# Patient Record
Sex: FEMALE | URBAN - METROPOLITAN AREA
[De-identification: names, ages, dates, MRNs, and addresses within clinical notes are randomized per-mention and may not be internally consistent; named-entity substitution may affect disease eponyms.]

---

## 2017-01-03 ENCOUNTER — IMPORTED ENCOUNTER (OUTPATIENT)
Dept: URBAN - METROPOLITAN AREA CLINIC 1 | Facility: CLINIC | Age: 62
End: 2017-01-03

## 2017-01-03 PROBLEM — H20.012: Noted: 2017-01-03

## 2017-01-03 PROBLEM — H10.402: Noted: 2017-01-03

## 2017-01-03 PROBLEM — H04.322: Noted: 2017-01-03

## 2017-01-03 PROCEDURE — 92012 INTRM OPH EXAM EST PATIENT: CPT

## 2017-01-03 NOTE — PATIENT DISCUSSION
1.  Unspecified Chronic Conjunctivitis OS:Tender medial canthus. No tearing2. Iritis: Primary episode OS: Pred forte QID to affected eye. Return immediately if symptoms worsen. 3. Return for an appointment in 1 week for 10 with Dr. Lilyan Gaucher.

## 2017-01-23 ENCOUNTER — IMPORTED ENCOUNTER (OUTPATIENT)
Dept: URBAN - METROPOLITAN AREA CLINIC 1 | Facility: CLINIC | Age: 62
End: 2017-01-23

## 2017-01-23 PROBLEM — H10.45: Noted: 2017-01-23

## 2017-01-23 PROBLEM — H25.813: Noted: 2017-01-23

## 2017-01-23 PROBLEM — H04.322: Noted: 2017-01-23

## 2017-01-23 PROCEDURE — 92012 INTRM OPH EXAM EST PATIENT: CPT

## 2017-01-23 NOTE — PATIENT DISCUSSION
1.  Acute Dacryocystitis OS - appears resolved on Tobradex. Use Tobradex BID OS x2 weeks then d/c. 2.  Cataract OU: Observe for now without intervention. The patient was advised to contact us if any change or worsening of vision3. Allergic Conjunctivitis OU improving -Cont Zaditor BID OUReturn for an appointment in November 30 with Dr. Janet Kong.

## 2017-02-02 ENCOUNTER — IMPORTED ENCOUNTER (OUTPATIENT)
Dept: URBAN - METROPOLITAN AREA CLINIC 1 | Facility: CLINIC | Age: 62
End: 2017-02-02

## 2017-02-02 PROBLEM — H04.322: Noted: 2017-02-02

## 2017-02-02 PROBLEM — H10.45: Noted: 2017-02-02

## 2017-02-02 PROBLEM — H25.813: Noted: 2017-02-02

## 2017-02-02 PROCEDURE — 92012 INTRM OPH EXAM EST PATIENT: CPT

## 2017-02-02 NOTE — PATIENT DISCUSSION
1.  Episcleritis OS- Start Durezol OS TID (samples given.) F/u in 1 week and consider rheumatological w/u. PCP Luis Alfredo Kinsey. D/C [place]. 2.  Cataract OU: Observe for now without intervention. The patient was advised to contact us if any change or worsening of vision3. Allergic Conjunctivitis OU- Stable OD. Progressive OS. Cont Zaditor BID OU4. Return for an appointment for a 10/check symtoms in 1 week with Dr. Melody Lim.

## 2017-02-08 ENCOUNTER — IMPORTED ENCOUNTER (OUTPATIENT)
Dept: URBAN - METROPOLITAN AREA CLINIC 1 | Facility: CLINIC | Age: 62
End: 2017-02-08

## 2017-02-08 PROBLEM — H25.813: Noted: 2017-02-08

## 2017-02-08 PROBLEM — H10.45: Noted: 2017-02-08

## 2017-02-08 PROBLEM — H04.322: Noted: 2017-02-08

## 2017-02-08 PROCEDURE — 92012 INTRM OPH EXAM EST PATIENT: CPT

## 2017-02-08 NOTE — PATIENT DISCUSSION
1.  Episcleritis OS- Much improved. Continue Durezol OS TID and then decrease to BID OS on Sunday for 1 week then decrease to QD OS until follow up. 2.  Cataract OU: Observe for now without intervention. The patient was advised to contact us if any change or worsening of vision. 3. Allergic Conjunctivitis OU - Stable OD. Improving OS. Cont Zaditor BID OU. Return for an appointment in 5-6 weeks for 10 with Dr. Santo Bond.

## 2017-08-15 ENCOUNTER — OFFICE VISIT (OUTPATIENT)
Dept: ORTHOPEDIC SURGERY | Facility: CLINIC | Age: 62
End: 2017-08-15

## 2017-08-15 VITALS
BODY MASS INDEX: 34.73 KG/M2 | OXYGEN SATURATION: 98 % | SYSTOLIC BLOOD PRESSURE: 133 MMHG | DIASTOLIC BLOOD PRESSURE: 78 MMHG | HEART RATE: 64 BPM | TEMPERATURE: 97.6 F | HEIGHT: 63 IN | WEIGHT: 196 LBS

## 2017-08-15 DIAGNOSIS — M79.89 RIGHT LEG SWELLING: ICD-10-CM

## 2017-08-15 DIAGNOSIS — M25.571 ACUTE RIGHT ANKLE PAIN: ICD-10-CM

## 2017-08-15 DIAGNOSIS — M17.11 PRIMARY OSTEOARTHRITIS OF RIGHT KNEE: Primary | ICD-10-CM

## 2017-08-15 RX ORDER — HYDROCHLOROTHIAZIDE 25 MG/1
TABLET ORAL
COMMUNITY
Start: 2017-07-10 | End: 2019-12-21

## 2017-08-15 RX ORDER — DUREZOL 0.5 MG/ML
EMULSION OPHTHALMIC
Refills: 3 | COMMUNITY
Start: 2017-08-06

## 2017-08-15 RX ORDER — NAPROXEN 375 MG/1
TABLET ORAL
Refills: 3 | COMMUNITY
Start: 2017-08-06

## 2017-08-15 RX ORDER — OMEPRAZOLE 40 MG/1
CAPSULE, DELAYED RELEASE ORAL
Refills: 3 | COMMUNITY
Start: 2017-08-05

## 2017-08-15 RX ORDER — BETAMETHASONE SODIUM PHOSPHATE AND BETAMETHASONE ACETATE 3; 3 MG/ML; MG/ML
6 INJECTION, SUSPENSION INTRA-ARTICULAR; INTRALESIONAL; INTRAMUSCULAR; SOFT TISSUE ONCE
Qty: 1 ML | Refills: 0
Start: 2017-08-15 | End: 2017-08-15

## 2017-08-15 RX ORDER — DILTIAZEM HYDROCHLORIDE 360 MG/1
CAPSULE, EXTENDED RELEASE ORAL
COMMUNITY
Start: 2017-07-10

## 2017-08-15 NOTE — PROGRESS NOTES
Patient: Krishna Sommers                MRN: [id number]       SSN: [id number]  YOB: 1955        AGE: 58 y.o. SEX: female  Body mass index is 34.72 kg/(m^2). PCP: Van Ruelas DO  08/15/17    HISTORY: Nathanael Oneil is here today mainly for right knee pain. She has noticed some swelling in her right ankle as well. It is not too sore, mainly just swollen, but she is having a lot of right knee pain. It has been ongoing, really, since the spring. She has had no particular trauma. She has had broken bones in the left foot and rheumatoid arthritis does run in the family on the mothers side. She has problems with stairs, kneeling, getting up and down from a chair, and some discomfort in the cast as well. PHYSICAL EXAMINATION:  On examination today, she does have just a slight pitting edema involving the right lower extremity. The calf is mildly tender on the right side but not swollen. Jeanette's sign is negative. The knee itself has good motion and a slight effusion. There is distinct medial joint line tenderness. She has a painful but nonclicking Judy's test.  The quadriceps are intact. The hips rotate nicely. The left lower extremity is noncontributory. She is alert and oriented. Affect is normal.  Sensation is normal.  She appears younger than her stated age of 46. RADIOGRAPHS:  Review of her x-rays, including three views of the ankle, are negative for fracture. She does have some arthritis in the hindfoot and the subtalar region, also a little bit tibiotalar, but not severely so. AP, tunnel, lateral, and skyline of the knees reveal moderate arthritis of the knees. IMPRESSION:  My overall impression is moderate arthritis, especially right knee, possibility of meniscal tear.      PROCEDURE:  Under aseptic conditions and after informed, written consent with a time out, the right knee was injected with 1 cc of the Celestone preparation, i.e. 6 mg, which was well tolerated. PLAN:  Treatment options were given, and I would recommend a cortisone injection. She is going to return to see us in a few weeks time to see how the injection is working, and tomorrow, we will get a Doppler ultrasound to rule out DVT for the right calf as well. It has been an absolute pleasure to share in her care. Certainly, if she is not pleased with the results of the right knee injection, we will be proceeding to MRI to look for meniscal tear. REVIEW OF SYSTEMS:      CON: negative for weight loss, fever  EYE: negative for double vision  ENT: negative for hoarseness  RS:   negative for Tb  GI:    negative for blood in stool  :  negative for blood in urine  Other systems reviewed and noted below. Past Medical History:   Diagnosis Date    [facility] obstructive pulmonary disease ([facility] [address]     Colon polyps     Hypertension     Left adrenal mass ([facility] [address]        History reviewed. No pertinent family history. Current Outpatient Prescriptions   Medication Sig Dispense Refill    naproxen (NAPROSYN) 375 mg tablet TAKE 1 TABLET BY MOUTH TWICE A DAY WITH FOOD  3    dilTIAZem (TIAZAC) 360 mg ER capsule       hydroCHLOROthiazide (HYDRODIURIL) 25 mg tablet       omeprazole (PRILOSEC) 40 mg capsule TAKE ONE CAPSULE BY MOUTH EVERY MORNING  3    DUREZOL 0.05 % ophthalmic emulsion INSTILL 1 DROP IN LEFT EYE ONCE DAILY  3    olmesartan-hydrochlorothiazide (BENICAR HCT) 40-25 mg per tablet Take 1 Tab by mouth daily.  multivitamins chew Take  by mouth. No Known Allergies    Past Surgical History:   Procedure Laterality Date    HX HYSTERECTOMY  2004    HX PARTIAL HYSTERECTOMY  1990s       Social History     Social History    Marital status: UNKNOWN     Spouse name: N/A    Number of children: N/A    Years of education: N/A     Occupational History    Not on file.      Social History Main Topics    Smoking status: Former Smoker    Smokeless tobacco: Never Used    Alcohol use 1.0 oz/week     2 Cans of beer per week    Drug use: Not on file    Sexual activity: Yes     Other Topics Concern    Not on file     Social History Narrative       Visit Vitals    /78    Pulse 64    Temp 97.6 °F (36.4 °C) (Oral)    Ht 5' 3\" (1.6 m)    Wt 196 lb (88.9 kg)    SpO2 98%    BMI 34.72 kg/m2         PHYSICAL EXAMINATION:  GENERAL: Alert and oriented x3, in no acute distress, well-developed, well-nourished, afebrile. HEART: No JVD. EYES: No scleral icterus   NECK: No significant lymphadenopathy   LUNGS: No respiratory compromise or indrawing  ABDOMEN: Soft, non-tender, non-distended. Electronically signed by:  Juan Godoy MD

## 2017-09-13 ENCOUNTER — TELEPHONE (OUTPATIENT)
Dept: ORTHOPEDIC SURGERY | Age: 62
End: 2017-09-13

## 2017-09-14 NOTE — TELEPHONE ENCOUNTER
Patient made aware of below info. She states that she will have to call back later and make an appointment with Dr. Harrison Munguia.

## 2018-03-21 ENCOUNTER — HOSPITAL ENCOUNTER (OUTPATIENT)
Dept: PHYSICAL THERAPY | Age: 63
Discharge: HOME | End: 2018-03-21
Payer: COMMERCIAL

## 2018-03-21 PROCEDURE — 97110 THERAPEUTIC EXERCISES: CPT

## 2018-03-21 PROCEDURE — 97162 PT EVAL MOD COMPLEX 30 MIN: CPT

## 2018-03-21 NOTE — PROGRESS NOTES
PT DAILY TREATMENT NOTE     Patient Name: Claudia Marie  Date:3/21/2018  : 1955  [x]  Patient  Verified  Payor: BLUE CROSS / Plan: Diana Mcke[employer] [id number] PPO / Product Type: PPO /    In time:508  Out time:543  Total Treatment Time (min): 35  Visit #: 1 of 8    Treatment Area: Pain in left knee [M25.562]    SUBJECTIVE  Pain Level (0-10 scale): 9  Any medication changes, allergies to medications, adverse drug reactions, diagnosis change, or new procedure performed?: [x] No    [] Yes (see summary sheet for update)  Subjective functional status/changes:   [] No changes reported  See eval    OBJECTIVE    10 min [x]Eval                  []Re-Eval       25 min Therapeutic Exercise:  [] See flow sheet :   Rationale: increase ROM and increase strength to improve the patients ability to perform daily activites       With   [] TE   [] TA   [] neuro   [] other: Patient Education: [x] Review HEP    [] Progressed/Changed HEP based on:   [] positioning   [] body mechanics   [] transfers   [] heat/ice application    [] other:      Other Objective/Functional Measures:      Pain Level (0-10 scale) post treatment: 8    ASSESSMENT/Changes in Function: see POC    Patient will continue to benefit from skilled PT services to modify and progress therapeutic interventions, address functional mobility deficits, address ROM deficits, address strength deficits, analyze and address soft tissue restrictions, analyze and cue movement patterns and address imbalance/dizziness to attain remaining goals. [x]  See Plan of Care  []  See progress note/recertification  []  See Discharge Summary         Progress towards goals / Updated goals:  Short Term Goals: To be accomplished in 2 weeks:                         1. I and compliant with HEP for self management of symptoms. 2. Increase L knee PROM to 0-120 to increase ease with sit<>stand. Long Term Goals:  To be accomplished in 4 weeks:                         1. Improve FOTO to 57 to indicate improved function with daily activities. 2. Increase L knee AROM to 0-130 to increase ease with reciprocal stair stepping.   3. Increase L LE strength to grossly 4/5 to enable patient to ambulate community distances without SPC.       PLAN  []  Upgrade activities as tolerated     [x]  Continue plan of care  []  Update interventions per flow sheet       []  Discharge due to:_  []  Other:_      Karol Dopp, PT 3/21/2018  6:33 PM    Future Appointments  Date Time Provider Corina Hall   3/23/2018 4:30 PM Sandra Giovany, PTA [facility]   3/28/2018 4:30 PM Sandra Giovany, PTA [facility]   3/30/2018 4:30 PM Sandra Giovany, PTA [facility]   4/2/2018 10:30 AM Sandra Giovany, PTA [facility]   4/9/2018 5:00 PM Sandra Giovany, PTA [facility]   4/11/2018 4:30 PM Karol Dopp, PT [facility]   4/18/2018 4:30 PM Karol Dopp, PT [facility]

## 2018-03-21 NOTE — PROGRESS NOTES
.[facility]  [address] [place] [address], [facility] [address]  [phone] fax    Plan of Care/ Statement of Necessity for Physical Therapy Services    Patient name: Miguel Patton Start of Care: 3/21/2018   Referral source: Lexus Murray MD : 1955    Medical Diagnosis: Pain in left knee [M25.562]   Onset Date:3/1/18    Treatment Diagnosis: L knee pain s/p meniscal debridement    Prior Hospitalization: see medical history Provider#: [id number]   Medications: Verified on Patient summary List    Comorbidities: HTN   Prior Level of Function: Able to ambulate without SPC; works in medical records (sits at desk); able to ascend/descend stairs     The Plan of Care and following information is based on the information from the initial evaluation. Assessment/ key information: 58y.o. year old female presents with CC of L knee pain and swelling. Deficits include: increased edema noted at L knee joint line and ankle; decreased L knee AROM, strength and stability. Patient will benefit from physical therapy to address deficits, and ultimately to return patient to prior level of function.     Evaluation Complexity History LOW Complexity : Zero comorbidities / personal factors that will impact the outcome / POC; Examination MEDIUM Complexity : 3 Standardized tests and measures addressing body structure, function, activity limitation and / or participation in recreation  ;Presentation LOW Complexity : Stable, uncomplicated  ;Clinical Decision Making MEDIUM Complexity : FOTO score of 26-74  Overall Complexity Rating: MEDIUM  Problem List: pain affecting function, decrease ROM, decrease strength, edema affecting function, impaired gait/ balance, decrease ADL/ functional abilitiies, decrease activity tolerance and decrease flexibility/ joint mobility   Treatment Plan may include any combination of the following: Therapeutic exercise, Neuromuscular re-education, Physical agent/modality, Gait/balance training, Manual therapy and Patient education  Patient / Family readiness to learn indicated by: asking questions, trying to perform skills and interest  Persons(s) to be included in education: patient (P)  Barriers to Learning/Limitations: None  Patient Goal (s): be able to do activities without pain  Patient Self Reported Health Status: good  Rehabilitation Potential: good    Short Term Goals: To be accomplished in 2 weeks:   1. I and compliant with HEP for self management of symptoms. 2. Increase L knee PROM to 0-120 to increase ease with sit<>stand. Long Term Goals: To be accomplished in 4 weeks:   1. Improve FOTO to 57 to indicate improved function with daily activities. 2. Increase L knee AROM to 0-130 to increase ease with reciprocal stair stepping. 3. Increase L LE strength to grossly 4/5 to enable patient to ambulate community distances without SPC. Frequency / Duration: Patient to be seen 2 times per week for 4 weeks. Patient/ Caregiver education and instruction: Diagnosis, prognosis, exercises   [x]  Plan of care has been reviewed with ALYSIA Hogue, PT 3/21/2018 6:28 PM    ________________________________________________________________________    I certify that the above Therapy Services are being furnished while the patient is under my care. I agree with the treatment plan and certify that this therapy is necessary.     [de-identified] Signature:____________________  Date:____________Time: _________    Please sign and return to [facility]  [address]  [place], [address]  [phone] fax

## 2018-03-23 ENCOUNTER — HOSPITAL ENCOUNTER (OUTPATIENT)
Dept: PHYSICAL THERAPY | Age: 63
Discharge: HOME | End: 2018-03-23
Payer: COMMERCIAL

## 2018-03-23 PROCEDURE — 97110 THERAPEUTIC EXERCISES: CPT

## 2018-03-23 PROCEDURE — 97140 MANUAL THERAPY 1/> REGIONS: CPT

## 2018-03-23 PROCEDURE — 97016 VASOPNEUMATIC DEVICE THERAPY: CPT

## 2018-03-23 PROCEDURE — 97112 NEUROMUSCULAR REEDUCATION: CPT

## 2018-03-23 NOTE — PROGRESS NOTES
PT DAILY TREATMENT NOTE     Patient Name: Alondra Hutton  Date:3/23/2018  : 1955  [x]  Patient  Verified  Payor: BLUE CROSS / Plan: [place] BLUE [facility] PPO / Product Type: PPO /    In time:4:20  Out time:5:05  Total Treatment Time (min): 45  Visit #: 2 of 8    Treatment Area: Pain in left knee [M25.562]    SUBJECTIVE  Pain Level (0-10 scale): 9/10  Any medication changes, allergies to medications, adverse drug reactions, diagnosis change, or new procedure performed?: [x] No    [] Yes (see summary sheet for update)  Subjective functional status/changes:   [] No changes reported  Pt reports no change in symptoms. Pt reports performing HEP without difficulty    OBJECTIVE    Modality rationale: decrease inflammation and decrease pain to improve the patients ability to tolerate ADLs.     Min Type Additional Details    [] Estim:  []Unatt       []IFC  []Premod                        []Other:  []w/ice   []w/heat  Position:  Location:    [] Estim: []Att    []TENS instruct  []NMES                    []Other:  []w/US   []w/ice   []w/heat  Position:  Location:    []  Traction: [] Cervical       []Lumbar                       [] Prone          []Supine                       []Intermittent   []Continuous Lbs:  [] before manual  [] after manual    []  Ultrasound: []Continuous   [] Pulsed                           []1MHz   []3MHz W/cm2:  Location:    []  Iontophoresis with dexamethasone         Location: [] Take home patch   [] In clinic    []  Ice     []  heat  []  Ice massage  []  Laser   []  Anodyne Position:  Location:    []  Laser with stim  []  Other:  Position:  Location:   10 [x]  Vasopneumatic Device Pressure:       [x] lo [] med [] hi   Temperature: [x] lo [] med [] hi   [] Skin assessment post-treatment:  []intact []redness- no adverse reaction    []redness - adverse reaction:     17 min Therapeutic Exercise:  [x] See flow sheet :   Rationale: increase ROM and increase strength to improve the patients ability to tolerate ADLs. 10 min Neuromuscular Re-education:  [x]  See flow sheet :   Rationale: increase strength, improve coordination and increase proprioception  to improve the patients ability to perform functional activities. 8 min Manual Therapy:  DTM to popliteus   Rationale: decrease pain, increase ROM and increase tissue extensibility to improve tolerance to functional activities. With   [] TE   [] TA   [] neuro   [] other: Patient Education: [x] Review HEP    [] Progressed/Changed HEP based on:   [] positioning   [] body mechanics   [] transfers   [] heat/ice application    [] other:      Other Objective/Functional Measures: initiated exercises as per flow sheet. Pain Level (0-10 scale) post treatment: 5/10    ASSESSMENT/Changes in Function: Pt demonstrates good proprioception with toe taps with no LOB. Patient will continue to benefit from skilled PT services to modify and progress therapeutic interventions, address functional mobility deficits, address ROM deficits, address strength deficits, analyze and address soft tissue restrictions, analyze and cue movement patterns and analyze and modify body mechanics/ergonomics to attain remaining goals. []  See Plan of Care  []  See progress note/recertification  []  See Discharge Summary         Progress towards goals / Updated goals:  Short Term Goals: To be accomplished in 2 weeks:                         2. I and compliant with HEP for self management of symptoms. - met per patient report. 3/23/18  2. Increase L knee PROM to 0-120 to increase ease with sit<>stand. Long Term Goals: To be accomplished in 4 weeks:                         1. Improve FOTO to 57 to indicate improved function with daily activities. 2. Increase L knee AROM to 0-130 to increase ease with reciprocal stair stepping. 3. Increase L LE strength to grossly 4/5 to enable patient to ambulate community distances without SPC.     PLAN  [] Upgrade activities as tolerated     []  Continue plan of care  []  Update interventions per flow sheet       []  Discharge due to:_  []  Other:_      Phong Glez, PTA 3/23/2018  4:24 PM    Future Appointments  Date Time Provider Corina Hall   3/23/2018 4:30 PM Phong Glez, PTA [facility]   3/28/2018 4:30 PM Phong Glez, PTA [facility]   3/30/2018 4:30 PM Phong Glez, PTA [facility]   4/2/2018 10:30 AM Phong Glez, PTA [facility]   4/9/2018 5:00 PM Verjordan lGez, PTA [facility]   4/11/2018 4:30 PM Melchor Johnson, PT [facility]   4/18/2018 4:30 PM Melchor Johnson, PT [facility]

## 2018-03-28 ENCOUNTER — HOSPITAL ENCOUNTER (OUTPATIENT)
Dept: PHYSICAL THERAPY | Age: 63
Discharge: HOME | End: 2018-03-28
Payer: COMMERCIAL

## 2018-03-28 PROCEDURE — 97116 GAIT TRAINING THERAPY: CPT

## 2018-03-28 PROCEDURE — 97110 THERAPEUTIC EXERCISES: CPT

## 2018-03-28 NOTE — PROGRESS NOTES
PT DAILY TREATMENT NOTE     Patient Name: Jyoti Knutson  Date:3/28/2018  : 1955  [x]  Patient  Verified  Payor: BLUE CROSS / Plan: Diana Mckeon [id number] PPO / Product Type: PPO /    In time:4:30  Out time:5:13  Total Treatment Time (min): 43  Visit #: 3 of 8    Treatment Area: Pain in left knee [M25.562]    SUBJECTIVE  Pain Level (0-10 scale): 7/10  Any medication changes, allergies to medications, adverse drug reactions, diagnosis change, or new procedure performed?: [x] No    [] Yes (see summary sheet for update)  Subjective functional status/changes:   [] No changes reported  Pt reports no new complaints of pain. Pt reports compliance with HEP. OBJECTIVE    Modality rationale: decrease inflammation and decrease pain to improve the patients ability to tolerate ADLs.     Min Type Additional Details    [] Estim:  []Unatt       []IFC  []Premod                        []Other:  []w/ice   []w/heat  Position:  Location:    [] Estim: []Att    []TENS instruct  []NMES                    []Other:  []w/US   []w/ice   []w/heat  Position:  Location:    []  Traction: [] Cervical       []Lumbar                       [] Prone          []Supine                       []Intermittent   []Continuous Lbs:  [] before manual  [] after manual    []  Ultrasound: []Continuous   [] Pulsed                           []1MHz   []3MHz W/cm2:  Location:    []  Iontophoresis with dexamethasone         Location: [] Take home patch   [] In clinic    []  Ice     []  heat  []  Ice massage  []  Laser   []  Anodyne Position:  Location:    []  Laser with stim  []  Other:  Position:  Location:   10 [x]  Vasopneumatic Device Pressure:       [x] lo [] med [] hi   Temperature: [x] lo [] med [] hi   [x] Skin assessment post-treatment:  [x]intact []redness- no adverse reaction    []redness - adverse reaction:     25 min Therapeutic Exercise:  [x] See flow sheet :   Rationale: increase ROM and increase strength to improve the patients ability to tolerate ADLs. 8 min Gait Trainin feet with no device on level surfaces with SBA level of assist   Rationale: With   [] TE   [] TA   [] neuro   [] other: Patient Education: [x] Review HEP    [] Progressed/Changed HEP based on:   [] positioning   [] body mechanics   [] transfers   [] heat/ice application    [] other:      Other Objective/Functional Measures: added gait training without AD. Pain Level (0-10 scale) post treatment: 6/10    ASSESSMENT/Changes in Function: Pt demonstrates good proprioception and control when ambulating without AD. Patient will continue to benefit from skilled PT services to modify and progress therapeutic interventions, address functional mobility deficits, address ROM deficits, address strength deficits, analyze and address soft tissue restrictions, analyze and cue movement patterns, analyze and modify body mechanics/ergonomics and assess and modify postural abnormalities to attain remaining goals. []  See Plan of Care  []  See progress note/recertification  []  See Discharge Summary         Progress towards goals / Updated goals:  Short Term Goals: To be accomplished in 2 weeks:                         3. I and compliant with HEP for self management of symptoms. - met per patient report. 3/23/18  2. Increase L knee PROM to 0-120 to increase ease with sit<>stand. Long Term Goals: To be accomplished in 4 weeks:                         1. Improve FOTO to 57 to indicate improved function with daily activities. 2. Increase L knee AROM to 0-130 to increase ease with reciprocal stair stepping. 3. Increase L LE strength to grossly 4/5 to enable patient to ambulate community distances without SPC.     PLAN  []  Upgrade activities as tolerated     [x]  Continue plan of care  []  Update interventions per flow sheet       []  Discharge due to:_  []  Other:_      Carl Jones PTA 3/28/2018  4:59 PM    Future Appointments  Date Time Provider Corina Isabel   3/30/2018 4:30 PM Robson Canner, PTA [facility]   4/2/2018 10:30 AM Robson Canner, PTA [facility]   4/9/2018 5:00 PM Robson Canner, PTA [facility]   4/11/2018 4:30 PM Cinda Axe, PT [facility]   4/18/2018 4:30 PM Cinda Axe, PT [facility]

## 2018-03-30 ENCOUNTER — HOSPITAL ENCOUNTER (OUTPATIENT)
Dept: PHYSICAL THERAPY | Age: 63
Discharge: HOME | End: 2018-03-30
Payer: COMMERCIAL

## 2018-03-30 PROCEDURE — 97112 NEUROMUSCULAR REEDUCATION: CPT

## 2018-03-30 PROCEDURE — 97016 VASOPNEUMATIC DEVICE THERAPY: CPT

## 2018-03-30 PROCEDURE — 97110 THERAPEUTIC EXERCISES: CPT

## 2018-03-30 NOTE — PROGRESS NOTES
PT DAILY TREATMENT NOTE - [facility]     Patient Name: Ana Reed  Date:3/30/2018  : 1955  [x]  Patient  Verified  Payor: BLUE CROSS / Plan: Diana Mckeon [id number] PPO / Product Type: PPO /    In time:4:30  Out time:5:11  Total Treatment Time (min): 41  Visit #: 4 of 8    Treatment Area: Pain in left knee [M25.562]    SUBJECTIVE  Pain Level (0-10 scale): 6/10  Any medication changes, allergies to medications, adverse drug reactions, diagnosis change, or new procedure performed?: [x] No    [] Yes (see summary sheet for update)  Subjective functional status/changes:   [] No changes reported  Pt reports no new complaints of pain. Pt reports compliance with HEP and decreased pain. OBJECTIVE    Modality rationale: decrease inflammation and decrease pain to improve the patients ability to tolerate ADLs.     Min Type Additional Details    [] Estim:  []Unatt       []IFC  []Premod                        []Other:  []w/ice   []w/heat  Position:  Location:    [] Estim: []Att    []TENS instruct  []NMES                    []Other:  []w/US   []w/ice   []w/heat  Position:  Location:    []  Traction: [] Cervical       []Lumbar                       [] Prone          []Supine                       []Intermittent   []Continuous Lbs:  [] before manual  [] after manual    []  Ultrasound: []Continuous   [] Pulsed                           []1MHz   []3MHz W/cm2:  Location:    []  Iontophoresis with dexamethasone         Location: [] Take home patch   [] In clinic    []  Ice     []  heat  []  Ice massage  []  Laser   []  Anodyne Position:  Location:    []  Laser with stim  []  Other:  Position:  Location:   10 [x]  Vasopneumatic Device Pressure:       [x] lo [] med [] hi   Temperature: [x] lo [] med [] hi   [] Skin assessment post-treatment:  []intact []redness- no adverse reaction    []redness - adverse reaction:     21 min Therapeutic Exercise:  [x] See flow sheet :   Rationale: increase ROM and increase strength to improve the patients ability to tolerate ADLs. 10 min Neuromuscular Re-education:  [x]  See flow sheet :   Rationale: increase strength, improve coordination and increase proprioception  to improve the patients ability to perform functional activities. With   [] TE   [] TA   [] neuro   [] other: Patient Education: [x] Review HEP    [] Progressed/Changed HEP based on:   [] positioning   [] body mechanics   [] transfers   [] heat/ice application    [] other:      Other Objective/Functional Measures: Pt ambulates without AD with no antalgic gait pattern. Pain Level (0-10 scale) post treatment: 7/10    ASSESSMENT/Changes in Function: Pt demonstrates improved functional mobility and strength with ability to ambulate without AD. Patient will continue to benefit from skilled PT services to modify and progress therapeutic interventions, address functional mobility deficits, address ROM deficits, address strength deficits, analyze and address soft tissue restrictions, analyze and cue movement patterns and analyze and modify body mechanics/ergonomics to attain remaining goals. []  See Plan of Care  []  See progress note/recertification  []  See Discharge Summary         Progress towards goals / Updated goals:  Short Term Goals: To be accomplished in 2 weeks:                         8. I and compliant with HEP for self management of symptoms.  - met per patient report. 3/23/18  2. Increase L knee PROM to 0-120 to increase ease with sit<>stand.- Met. 3/30/18  Long Term Goals: To be accomplished in 4 weeks:                         1. Improve FOTO to 57 to indicate improved function with daily activities. 2. Increase L knee AROM to 0-130 to increase ease with reciprocal stair stepping. 3. Increase L LE strength to grossly 4/5 to enable patient to ambulate community distances without SPC.     PLAN  []  Upgrade activities as tolerated     [x]  Continue plan of care  []  Update interventions per flow sheet       []  Discharge due to:_  []  Other:_      Katarina Hussein PTA 3/30/2018  5:11 PM    Future Appointments  Date Time Provider Corina Hall   4/2/2018 10:30 AM Katarina Hussein PTA [facility]   4/9/2018 5:00 PM Katarina Hussein PTA [facility]   4/11/2018 4:30 PM Layla Miguel, PT [facility]   4/18/2018 4:30 PM Layla Miguel, PT [facility]

## 2018-04-02 ENCOUNTER — HOSPITAL ENCOUNTER (OUTPATIENT)
Dept: PHYSICAL THERAPY | Age: 63
Discharge: HOME | End: 2018-04-02
Payer: COMMERCIAL

## 2018-04-02 PROCEDURE — 97016 VASOPNEUMATIC DEVICE THERAPY: CPT

## 2018-04-02 PROCEDURE — 97110 THERAPEUTIC EXERCISES: CPT

## 2018-04-02 PROCEDURE — 97112 NEUROMUSCULAR REEDUCATION: CPT

## 2018-04-02 NOTE — PROGRESS NOTES
PT DAILY TREATMENT NOTE     Patient Name: Jennifer Began  Date:2018  : 1955  [x]  Patient  Verified  Payor: BLUE CROSS / Plan: [facility] PPO / Product Type: PPO /    In time:10:32  Out time:11:13  Total Treatment Time (min): 41  Visit #: 5 of 8    Treatment Area: Pain in left knee [M25.562]    SUBJECTIVE  Pain Level (0-10 scale): 7/10  Any medication changes, allergies to medications, adverse drug reactions, diagnosis change, or new procedure performed?: [x] No    [] Yes (see summary sheet for update)  Subjective functional status/changes:   [] No changes reported  Pt reports no new complaints of pain. Pt reports compliance with HEP. OBJECTIVE    Modality rationale: decrease inflammation and decrease pain to improve the patients ability to tolerate ADLs.     Min Type Additional Details    [] Estim:  []Unatt       []IFC  []Premod                        []Other:  []w/ice   []w/heat  Position:  Location:    [] Estim: []Att    []TENS instruct  []NMES                    []Other:  []w/US   []w/ice   []w/heat  Position:  Location:    []  Traction: [] Cervical       []Lumbar                       [] Prone          []Supine                       []Intermittent   []Continuous Lbs:  [] before manual  [] after manual    []  Ultrasound: []Continuous   [] Pulsed                           []1MHz   []3MHz W/cm2:  Location:    []  Iontophoresis with dexamethasone         Location: [] Take home patch   [] In clinic    []  Ice     []  heat  []  Ice massage  []  Laser   []  Anodyne Position:  Location:    []  Laser with stim  []  Other:  Position:  Location:   10 [x]  Vasopneumatic Device Pressure:       [x] lo [] med [] hi   Temperature: [x] lo [] med [] hi   [] Skin assessment post-treatment:  []intact []redness- no adverse reaction    []redness - adverse reaction:     21 min Therapeutic Exercise:  [x] See flow sheet :   Rationale: increase ROM and increase strength to improve the patients ability to tolerate ADLs. 10 min Neuromuscular Re-education:  [x]  See flow sheet :   Rationale: increase strength, improve coordination and increase proprioception  to improve the patients ability to tolerate ADLs. With   [] TE   [] TA   [] neuro   [] other: Patient Education: [x] Review HEP    [] Progressed/Changed HEP based on:   [] positioning   [] body mechanics   [] transfers   [] heat/ice application    [] other:      Other Objective/Functional Measures: Added shuttle press; mod vc's to correct form with all exercises. FOTO: 30    Pain Level (0-10 scale) post treatment: 7/10    ASSESSMENT/Changes in Function: pt demonstrates good form and LLE control with all therapeutic exercises. Patient will continue to benefit from skilled PT services to modify and progress therapeutic interventions, address functional mobility deficits, address ROM deficits, address strength deficits, analyze and address soft tissue restrictions and analyze and cue movement patterns to attain remaining goals. []  See Plan of Care  []  See progress note/recertification  []  See Discharge Summary         Progress towards goals / Updated goals:  Short Term Goals: To be accomplished in 2 weeks:                         5. I and compliant with HEP for self management of symptoms.  - met per patient report. 3/23/18  2. Increase L knee PROM to 0-120 to increase ease with sit<>stand.- Met. 3/30/18  Long Term Goals: To be accomplished in 4 weeks:                         1. Improve FOTO to 57 to indicate improved function with daily activities. - not met FOTO 60 30%. 4/2/18   2. Increase L knee AROM to 0-130 to increase ease with reciprocal stair stepping. 3. Increase L LE strength to grossly 4/5 to enable patient to ambulate community distances without SPC.     PLAN  []  Upgrade activities as tolerated     [x]  Continue plan of care  []  Update interventions per flow sheet       []  Discharge due to:_  []  Other:_ Pierce Melendez PTA 4/2/2018  10:47 AM    Future Appointments  Date Time Provider Corina Hall   4[facility]9/2018 5:00 PM [facility]   4[facility]11/2018 4:30 [facility]   4/18/2018 4:30 PM [facility]

## 2018-04-09 ENCOUNTER — HOSPITAL ENCOUNTER (OUTPATIENT)
Dept: PHYSICAL THERAPY | Age: 63
Discharge: HOME | End: 2018-04-09
Payer: COMMERCIAL

## 2018-04-09 PROCEDURE — 97112 NEUROMUSCULAR REEDUCATION: CPT

## 2018-04-09 PROCEDURE — 97110 THERAPEUTIC EXERCISES: CPT

## 2018-04-09 PROCEDURE — 97016 VASOPNEUMATIC DEVICE THERAPY: CPT

## 2018-04-09 NOTE — PROGRESS NOTES
PT DAILY TREATMENT NOTE     Patient Name: Griselda Rey  Date:2018  : 1955  [x]  Patient  Verified  Payor: BLUE CROSS / Plan: Diana Mckeon [id number] PPO / Product Type: PPO /    In time:5:00  Out time:5:40  Total Treatment Time (min): 40  Visit #: 6 of 8    Treatment Area: Pain in left knee [M25.562]    SUBJECTIVE  Pain Level (0-10 scale): 510  Any medication changes, allergies to medications, adverse drug reactions, diagnosis change, or new procedure performed?: [x] No    [] Yes (see summary sheet for update)  Subjective functional status/changes:   [] No changes reported  Pt reports no new complaints of pain. Pt reports compliance with HEP. OBJECTIVE    Modality rationale: decrease inflammation and decrease pain to improve the patients ability to tolerate ADLs.     Min Type Additional Details    [] Estim:  []Unatt       []IFC  []Premod                        []Other:  []w/ice   []w/heat  Position:  Location:    [] Estim: []Att    []TENS instruct  []NMES                    []Other:  []w/US   []w/ice   []w/heat  Position:  Location:    []  Traction: [] Cervical       []Lumbar                       [] Prone          []Supine                       []Intermittent   []Continuous Lbs:  [] before manual  [] after manual    []  Ultrasound: []Continuous   [] Pulsed                           []1MHz   []3MHz W/cm2:  Location:    []  Iontophoresis with dexamethasone         Location: [] Take home patch   [] In clinic    []  Ice     []  heat  []  Ice massage  []  Laser   []  Anodyne Position:  Location:    []  Laser with stim  []  Other:  Position:  Location:   10 [x]  Vasopneumatic Device Pressure:       [x] lo [] med [] hi   Temperature: [x] lo [] med [] hi   [] Skin assessment post-treatment:  []intact []redness- no adverse reaction    []redness - adverse reaction:     20 min Therapeutic Exercise:  [x] See flow sheet :   Rationale: increase ROM and increase strength to improve the patients ability to tolerate ADLs. 10 min Neuromuscular Re-education:  []  See flow sheet :   Rationale: increase strength, improve coordination and increase proprioception  to improve the patients ability to perform functional activities. With   [] TE   [] TA   [] neuro   [] other: Patient Education: [x] Review HEP    [] Progressed/Changed HEP based on:   [] positioning   [] body mechanics   [] transfers   [] heat/ice application    [] other:      Other Objective/Functional Measures: AROM Right knee 0-125 degrees. Pain Level (0-10 scale) post treatment: 6/10    ASSESSMENT/Changes in Function: Pt demonstrates continued progress with functional mobility and AROM. Minimal antalgic gait pattern noted when fatigued. Patient will continue to benefit from skilled PT services to modify and progress therapeutic interventions, address functional mobility deficits, address ROM deficits, address strength deficits, analyze and address soft tissue restrictions, analyze and cue movement patterns and analyze and modify body mechanics/ergonomics to attain remaining goals. []  See Plan of Care  []  See progress note/recertification  []  See Discharge Summary         Progress towards goals / Updated goals:  Short Term Goals: To be accomplished in 2 weeks:                         9. I and compliant with HEP for self management of symptoms.  - met per patient report. 3/23/18  2. Increase L knee PROM to 0-120 to increase ease with sit<>stand.- Met. 3/30/18  Long Term Goals: To be accomplished in 4 weeks:                         1. Improve FOTO to 57 to indicate improved function with daily activities. - not met FOTO 60 30%. 4/2/18   2. Increase L knee AROM to 0-130 to increase ease with reciprocal stair stepping.- Progressed 0-125 degrees. 4/9/18  3. Increase L LE strength to grossly 4/5 to enable patient to ambulate community distances without SPC.     PLAN  []  Upgrade activities as tolerated     [x]  Continue plan of care  []  Update interventions per flow sheet       []  Discharge due to:_  []  Other:_      Romelia Sparrow, PTA 4/9/2018  5:23 PM    Future Appointments  Date Time Provider Corina Hall   4/11/2018 4:30 PM Deloris Finney, PT [facility]   4/18/2018 4:30 PM Deloris Finney, PT [facility]

## 2018-04-11 ENCOUNTER — HOSPITAL ENCOUNTER (OUTPATIENT)
Dept: PHYSICAL THERAPY | Age: 63
Discharge: HOME | End: 2018-04-11
Payer: COMMERCIAL

## 2018-04-11 PROCEDURE — 97016 VASOPNEUMATIC DEVICE THERAPY: CPT

## 2018-04-11 PROCEDURE — 97112 NEUROMUSCULAR REEDUCATION: CPT

## 2018-04-11 PROCEDURE — 97110 THERAPEUTIC EXERCISES: CPT

## 2018-04-11 NOTE — PROGRESS NOTES
PT DAILY TREATMENT NOTE     Patient Name: Ainsley Walker  Date:2018  : 1955  [x]  Patient  Verified  Payor: BLUE CROSS / Plan: [facility] PPO / Product Type: PPO /    In time:430  Out time:517  Total Treatment Time (min): 52  Visit #: 7 of 8    Treatment Area: Pain in left knee [M25.562]    SUBJECTIVE  Pain Level (0-10 scale): 5  Any medication changes, allergies to medications, adverse drug reactions, diagnosis change, or new procedure performed?: [x] No    [] Yes (see summary sheet for update)  Subjective functional status/changes:   [x] No changes reported      OBJECTIVE    Modality rationale: decrease inflammation and decrease pain to improve the patients ability to tolerate post exercise soreness   Min Type Additional Details    [] Estim:  []Unatt       []IFC  []Premod                        []Other:  []w/ice   []w/heat  Position:  Location:    [] Estim: []Att    []TENS instruct  []NMES                    []Other:  []w/US   []w/ice   []w/heat  Position:  Location:    []  Traction: [] Cervical       []Lumbar                       [] Prone          []Supine                       []Intermittent   []Continuous Lbs:  [] before manual  [] after manual    []  Ultrasound: []Continuous   [] Pulsed                           []1MHz   []3MHz W/cm2:  Location:    []  Iontophoresis with dexamethasone         Location: [] Take home patch   [] In clinic    []  Ice     []  heat  []  Ice massage  []  Laser   []  Anodyne Position:  Location:    []  Laser with stim  []  Other:  Position:  Location:   10 [x]  Vasopneumatic Device Pressure:       [x] lo [] med [] hi   Temperature: [] lo [] med [] hi   [x] Skin assessment post-treatment:  []intact []redness- no adverse reaction        27 min Therapeutic Exercise:  [] See flow sheet :   Rationale: increase ROM, increase strength and improve coordination to improve the patients ability to perform daily activities    10 min Neuromuscular Re-education:  []  See flow sheet :   Rationale: increase strength, improve coordination, improve balance and increase proprioception  to improve the patients ability to ambulate community distances        With   [] TE   [] TA   [] neuro   [] other: Patient Education: [x] Review HEP    [] Progressed/Changed HEP based on:   [] positioning   [] body mechanics   [] transfers   [] heat/ice application    [] other:      Other Objective/Functional Measures:      Pain Level (0-10 scale) post treatment: 5    ASSESSMENT/Changes in Function: Pain levels are not consistent with ability to perform all exercises without difficulty. D/C next visit    Patient will continue to benefit from skilled PT services to modify and progress therapeutic interventions, address functional mobility deficits, address strength deficits, analyze and address soft tissue restrictions and analyze and cue movement patterns to attain remaining goals. []  See Plan of Care  []  See progress note/recertification  []  See Discharge Summary         Progress towards goals / Updated goals:  Short Term Goals: To be accomplished in 2 weeks:                         9. I and compliant with HEP for self management of symptoms.  - met per patient report. 3/23/18  2. Increase L knee PROM to 0-120 to increase ease with sit<>stand.- Met. 3/30/18  Long Term Goals: To be accomplished in 4 weeks:                         1. Improve FOTO to 57 to indicate improved function with daily activities. - not met FOTO 60 30%. 4/2/18   2. Increase L knee AROM to 0-130 to increase ease with reciprocal stair stepping.- Progressed 0-125 degrees. 4/9/18  3. Increase L LE strength to grossly 4/5 to enable patient to ambulate community distances without SPC. met        PLAN  [x]  Upgrade activities as tolerated     []  Continue plan of care  []  Update interventions per flow sheet       []  Discharge due to:_  []  Other:_      Azael Meghana, PT 4/11/2018  5:31 PM    Future Appointments  Date Time Provider Corina Hall   4/18/2018 4:30 PM Tom Becerra, PT [facility]

## 2018-04-18 ENCOUNTER — HOSPITAL ENCOUNTER (OUTPATIENT)
Dept: PHYSICAL THERAPY | Age: 63
Discharge: HOME | End: 2018-04-18
Payer: COMMERCIAL

## 2018-04-18 PROCEDURE — 97016 VASOPNEUMATIC DEVICE THERAPY: CPT

## 2018-04-18 PROCEDURE — 97110 THERAPEUTIC EXERCISES: CPT

## 2018-04-18 PROCEDURE — 97140 MANUAL THERAPY 1/> REGIONS: CPT

## 2018-04-18 NOTE — PROGRESS NOTES
[facility]  [address]  [place], [address]  [phone] fax    Physical Therapy Progress Note  Patient name: Saulo Morales Start of Care: 3/21/18   Referral source: Milton Khan MD : 1955   Medical/Treatment Diagnosis: Pain in left knee [M25.562] Onset Date:3/1/18     Prior Hospitalization: see medical history Provider#: [id number]   Medications: Verified on Patient Summary List    Comorbidities: HTN  Prior Level of Function:Able to ambulate without SPC; works in medical records (sits at desk); able to ascend/descend stairs  Visits from Start of Care: 8    Missed Visits: 0      Established Goals:        Excellent         Good         Limited            None  [x] Increased ROM   []  [x]  []  []  [x] Increased Strength  []  [x]  []  []  [x] Increased Mobility  []  [x]  []  []   [x] Decreased Pain   []  [x]  []  []  [] Decreased Swelling  []  []  []  []    Key Functional Changes: l hip strength ieshaly 4-/5    Updated Goals: to be achieved in 2 weeks: continue towards unmet goals   1. Improve FOTO to 57 to indicate improved function with daily activities. - not met FOTO 60 30%. 18   2. Increase L knee AROM to 0-130 to increase ease with reciprocal stair stepping.- Progressed 0-125 degrees. 18; met 18  3. Increase L LE strength to grossly 4/5 to enable patient to ambulate community distances without SPC. hip ABD/EXT 4-/5     ASSESSMENT/RECOMMENDATIONS:  [x]Continue therapy per initial plan/protocol at a frequency of  2 x per week for 2 weeks  []Continue therapy with the following recommended changes:_____________________      _____________________________________________________________________  []Discontinue therapy progressing towards or have reached established goals  []Discontinue therapy due to lack of appreciable progress towards goals  []Discontinue therapy due to lack of attendance or compliance  []Await Physician's recommendations/decisions regarding therapy  []Other:________________________________________________________________    Thank you for this referral.    Cinda Urban, PT 4/18/2018 6:02 PM  NOTE TO PHYSICIAN:  PLEASE COMPLETE THE ORDERS BELOW AND   FAX TO [facility]: [phone]  If you are unable to process this request in 24 hours please contact our office: [phone]    ? I have read the above report and request that my patient continue as recommended. ? I have read the above report and request that my patient continue therapy with the following changes/special instructions:__________________________________________________________  ? I have read the above report and request that my patient be discharged from therapy.     Physicians signature: ______________________________Date: ______Time:______

## 2018-04-18 NOTE — PROGRESS NOTES
PT DAILY TREATMENT NOTE     Patient Name: Ritu Fonseca  Date:2018  : 1955  [x]  Patient  Verified  Payor: BLUE CROSS / Plan: [facility] PPO / Product Type: PPO /    In time:431  Out time:516  Total Treatment Time (min): 45  Visit #: 8 of 8    Treatment Area: Pain in left knee [M25.562]    SUBJECTIVE  Pain Level (0-10 scale): 6  Any medication changes, allergies to medications, adverse drug reactions, diagnosis change, or new procedure performed?: [x] No    [] Yes (see summary sheet for update)  Subjective functional status/changes:   [] No changes reported  It really aches under my kneecap.      OBJECTIVE    Modality rationale: decrease pain to improve the patients ability to tolerate post exercise soreness   Min Type Additional Details    [] Estim:  []Unatt       []IFC  []Premod                        []Other:  []w/ice   []w/heat  Position:  Location:    [] Estim: []Att    []TENS instruct  []NMES                    []Other:  []w/US   []w/ice   []w/heat  Position:  Location:    []  Traction: [] Cervical       []Lumbar                       [] Prone          []Supine                       []Intermittent   []Continuous Lbs:  [] before manual  [] after manual    []  Ultrasound: []Continuous   [] Pulsed                           []1MHz   []3MHz W/cm2:  Location:    []  Iontophoresis with dexamethasone         Location: [] Take home patch   [] In clinic    []  Ice     []  heat  []  Ice massage  []  Laser   []  Anodyne Position:  Location:    []  Laser with stim  []  Other:  Position:  Location:   10 [x]  Vasopneumatic Device Pressure:       [x] lo [] med [] hi   Temperature: [x] lo [] med [] hi   [] Skin assessment post-treatment:  []intact []redness- no adverse reaction        27 min Therapeutic Exercise:  [] See flow sheet :   Rationale: increase ROM and increase strength to improve the patients ability to perform daily activities    8 min Manual Therapy:  reassessment     With [] TE   [] TA   [] neuro   [] other: Patient Education: [x] Review HEP    [] Progressed/Changed HEP based on:   [] positioning   [] body mechanics   [] transfers   [] heat/ice application    [] other:      Other Objective/Functional Measures: l hip strength gorssly 4-/5     Pain Level (0-10 scale) post treatment: 0    ASSESSMENT/Changes in Function: Patient continues to have moderate pain and lacks strength in L hip ABD/EXT. She will benefit from continuing with PT to further progress with strengthening. Patient will continue to benefit from skilled PT services to modify and progress therapeutic interventions, address strength deficits, analyze and address soft tissue restrictions and analyze and cue movement patterns to attain remaining goals. []  See Plan of Care  [x]  See progress note/recertification  []  See Discharge Summary         Progress towards goals / Updated goals:  Short Term Goals: To be accomplished in 2 weeks:                         4. I and compliant with HEP for self management of symptoms.  - met per patient report. 3/23/18  2. Increase L knee PROM to 0-120 to increase ease with sit<>stand.- Met. 3/30/18  Long Term Goals: To be accomplished in 4 weeks:                         1. Improve FOTO to 57 to indicate improved function with daily activities. - not met FOTO 60 30%. 4/2/18   2. Increase L knee AROM to 0-130 to increase ease with reciprocal stair stepping.- Progressed 0-125 degrees. 4/9/18; met 4/18/18  3. Increase L LE strength to grossly 4/5 to enable patient to ambulate community distances without SPC. hip ABD/EXT 4-/5        PLAN  [x]  Upgrade activities as tolerated     []  Continue plan of care  []  Update interventions per flow sheet       []  Discharge due to:_  []  Other:_      Dylan Last, PT 4/18/2018  4:54 PM    No future appointments.

## 2018-04-25 ENCOUNTER — HOSPITAL ENCOUNTER (OUTPATIENT)
Dept: PHYSICAL THERAPY | Age: 63
Discharge: HOME | End: 2018-04-25
Payer: COMMERCIAL

## 2018-04-25 PROCEDURE — 97112 NEUROMUSCULAR REEDUCATION: CPT

## 2018-04-25 PROCEDURE — 97110 THERAPEUTIC EXERCISES: CPT

## 2018-04-25 PROCEDURE — 97016 VASOPNEUMATIC DEVICE THERAPY: CPT

## 2018-04-25 NOTE — PROGRESS NOTES
PT DAILY TREATMENT NOTE     Patient Name: Julio Plascencia  Date:2018  : 1955  [x]  Patient  Verified  Payor: BLUE CROSS / Plan: [place] PPO / Product Type: PPO /    In time:500  Out time:542  Total Treatment Time (min): 42  Visit #: 1 of 4    Treatment Area: Pain in left knee [M25.562]    SUBJECTIVE  Pain Level (0-10 scale): 3  Any medication changes, allergies to medications, adverse drug reactions, diagnosis change, or new procedure performed?: [x] No    [] Yes (see summary sheet for update)  Subjective functional status/changes:   [] No changes reported  It's feeling better, but the back of my heels are hurting now on both feet.      OBJECTIVE    Modality rationale: decrease pain to improve the patients ability to tolerate post exercise soreness   Min Type Additional Details    [] Estim:  []Unatt       []IFC  []Premod                        []Other:  []w/ice   []w/heat  Position:  Location:    [] Estim: []Att    []TENS instruct  []NMES                    []Other:  []w/US   []w/ice   []w/heat  Position:  Location:    []  Traction: [] Cervical       []Lumbar                       [] Prone          []Supine                       []Intermittent   []Continuous Lbs:  [] before manual  [] after manual    []  Ultrasound: []Continuous   [] Pulsed                           []1MHz   []3MHz W/cm2:  Location:    []  Iontophoresis with dexamethasone         Location: [] Take home patch   [] In clinic    []  Ice     []  heat  []  Ice massage  []  Laser   []  Anodyne Position:  Location:    []  Laser with stim  []  Other:  Position:  Location:   10 [x]  Vasopneumatic Device Pressure:       [x] lo [] med [] hi   Temperature: [x] lo [] med [] hi   [] Skin assessment post-treatment:  []intact []redness- no adverse reaction    []redness - adverse reaction:       24 min Therapeutic Exercise:  [] See flow sheet :   Rationale: increase strength, improve coordination, improve balance and increase proprioception to improve the patients ability to perform daily activities     8 min Neuromuscular Re-education:  []  See flow sheet :   Rationale: increase strength, improve coordination, improve balance and increase proprioception  to improve the patients ability to ambulate community distances          With   [] TE   [] TA   [] neuro   [] other: Patient Education: [x] Review HEP    [] Progressed/Changed HEP based on:   [] positioning   [] body mechanics   [] transfers   [] heat/ice application    [] other:      Other Objective/Functional Measures: added new closed chained and balance activities     Pain Level (0-10 scale) post treatment: 3    ASSESSMENT/Changes in Function: Challenged with glute med strengthening exercises, but able to engage appropriately. Patient will continue to benefit from skilled PT services to modify and progress therapeutic interventions, address functional mobility deficits, address strength deficits and analyze and cue movement patterns to attain remaining goals. []  See Plan of Care  []  See progress note/recertification  []  See Discharge Summary         Progress towards goals / Updated goals:              1. Improve FOTO to 57 to indicate improved function with daily activities. - not met FOTO 60 30%. 4/2/18   2. Increase L knee AROM to 0-130 to increase ease with reciprocal stair stepping.- Progressed 0-125 degrees. 4/9/18; met 4/18/18  3. Increase L LE strength to grossly 4/5 to enable patient to ambulate community distances without SPC. hip ABD/EXT 4-/5     PLAN  []  Upgrade activities as tolerated     [x]  Continue plan of care  []  Update interventions per flow sheet       []  Discharge due to:_  []  Other:_      Tom Becerra, NAOMIE 4/25/2018  5:24 PM    Future Appointments  Date Time Provider Corina Hall   4/27/2018 3:00 PM Tom Becerra, PT [facility]   4/30/2018 5:00 PM Pilar Starr PTA [facility]   5/4/2018 3:00 PM Shakira Willis PT [facility]

## 2018-04-27 ENCOUNTER — HOSPITAL ENCOUNTER (OUTPATIENT)
Dept: PHYSICAL THERAPY | Age: 63
Discharge: HOME | End: 2018-04-27
Payer: COMMERCIAL

## 2018-04-27 PROCEDURE — 97112 NEUROMUSCULAR REEDUCATION: CPT

## 2018-04-27 PROCEDURE — 97110 THERAPEUTIC EXERCISES: CPT

## 2018-04-27 NOTE — PROGRESS NOTES
PT DAILY TREATMENT NOTE     Patient Name: Rigo Moore  Date:2018  : 1955  [x]  Patient  Verified  Payor: BLUE CROSS / Plan: Diana [employer]ke[employer] [id number] PPO / Product Type: PPO /    In time:300  Out time:326  Total Treatment Time (min): 26  Visit #: 2 of 4    Treatment Area: Pain in left knee [M25.562]    SUBJECTIVE  Pain Level (0-10 scale): 3  Any medication changes, allergies to medications, adverse drug reactions, diagnosis change, or new procedure performed?: [x] No    [] Yes (see summary sheet for update)  Subjective functional status/changes:   [] No changes reported  I'm feeling pretty good. I felt sore after the last work out. OBJECTIVE    18 min Therapeutic Exercise:  [] See flow sheet :   Rationale: increase strength, improve coordination, improve balance and increase proprioception to improve the patients ability to perform daily activities    8 min Neuromuscular Re-education:  []  See flow sheet :   Rationale: increase strength, improve coordination, improve balance and increase proprioception  to improve the patients ability to ambulate community distances. With   [] TE   [] TA   [] neuro   [] other: Patient Education: [x] Review HEP    [] Progressed/Changed HEP based on:   [] positioning   [] body mechanics   [] transfers   [] heat/ice application    [] other:      Other Objective/Functional Measures:      Pain Level (0-10 scale) post treatment: 2    ASSESSMENT/Changes in Function: Able to hold SLS x 30\" on airex without LOB. Patient will continue to benefit from skilled PT services to modify and progress therapeutic interventions, address functional mobility deficits, address strength deficits, analyze and address soft tissue restrictions and analyze and cue movement patterns to attain remaining goals. []  See Plan of Care  []  See progress note/recertification  []  See Discharge Summary         Progress towards goals / Updated goals:     1.  Improve FOTO to 57 to indicate improved function with daily activities. - not met FOTO 60 30%. 4/2/18   2. Increase L knee AROM to 0-130 to increase ease with reciprocal stair stepping.- Progressed 0-125 degrees. 4/9/18; met 4/18/18  3. Increase L LE strength to grossly 4/5 to enable patient to ambulate community distances without SPC. hip ABD/EXT 4-/5     PLAN  [x]  Upgrade activities as tolerated     []  Continue plan of care  []  Update interventions per flow sheet       []  Discharge due to:_  []  Other:_      Leatha Gaitan, PT 4/27/2018  3:32 PM    Future Appointments  Date Time Provider Corina Hall   4/30/2018 5:00 PM [facility]   5/4/2018 3:00 PM [facility] Preciado[facility]

## 2018-04-30 ENCOUNTER — HOSPITAL ENCOUNTER (OUTPATIENT)
Dept: PHYSICAL THERAPY | Age: 63
End: 2018-04-30
Payer: COMMERCIAL

## 2018-05-04 ENCOUNTER — APPOINTMENT (OUTPATIENT)
Dept: PHYSICAL THERAPY | Age: 63
End: 2018-05-04

## 2018-05-09 ENCOUNTER — APPOINTMENT (OUTPATIENT)
Dept: PHYSICAL THERAPY | Age: 63
End: 2018-05-09

## 2018-05-11 ENCOUNTER — APPOINTMENT (OUTPATIENT)
Dept: PHYSICAL THERAPY | Age: 63
End: 2018-05-11

## 2018-06-12 NOTE — PROGRESS NOTES
[facility]   [address]  [place], [address]  [phone] fax    Physical Therapy Discharge Summary  Patient name: Ritu Fonseca Start of Care: 3/21/18   Referral source: Jeny Pride MD : 1955   Medical/Treatment Diagnosis: Pain in left knee [M25.562] Onset Date:3/1/18     Prior Hospitalization: see medical history Provider#: [id number]   Medications: Verified on Patient Summary List    Comorbidities: HTN  Prior Level of Function:Able to ambulate without SPC; works in medical records (sits at desk); able to ascend/descend stairs  Visits from Start of Care: 10    Missed Visits: 0  Reporting Period : 18 to 18      Summary of Care:  1. Improve FOTO to 57 to indicate improved function with daily activities. - not met FOTO 60 30%. 18   2. Increase L knee AROM to 0-130 to increase ease with reciprocal stair stepping.- Progressed 0-125 degrees. 18; met 18  3. Increase L LE strength to grossly 4/5 to enable patient to ambulate community distances without SPC. hip ABD/EXT 4-/5   ASSESSMENT/RECOMMENDATIONS:  [x]Discontinue therapy: [x]Patient has reached or is progressing toward set goals      []Patient is non-compliant or has abdicated      []Due to lack of appreciable progress towards set Ul. Jesi Cuellar, PT 2018 2:54 PM

## 2019-12-21 ENCOUNTER — HOSPITAL ENCOUNTER (EMERGENCY)
Age: 64
Discharge: HOME | End: 2019-12-21
Attending: EMERGENCY MEDICINE
Payer: COMMERCIAL

## 2019-12-21 VITALS
WEIGHT: 198 LBS | RESPIRATION RATE: 18 BRPM | SYSTOLIC BLOOD PRESSURE: 136 MMHG | OXYGEN SATURATION: 97 % | HEIGHT: 63 IN | DIASTOLIC BLOOD PRESSURE: 70 MMHG | HEART RATE: 73 BPM | TEMPERATURE: 98.7 F | BODY MASS INDEX: 35.08 KG/M2

## 2019-12-21 DIAGNOSIS — L03.317 CELLULITIS OF BUTTOCK: Primary | ICD-10-CM

## 2019-12-21 DIAGNOSIS — R21 RASH: ICD-10-CM

## 2019-12-21 PROCEDURE — 99283 EMERGENCY DEPT VISIT LOW MDM: CPT

## 2019-12-21 RX ORDER — CEPHALEXIN 500 MG/1
500 CAPSULE ORAL 3 TIMES DAILY
Qty: 15 CAP | Refills: 0 | Status: SHIPPED | OUTPATIENT
Start: 2019-12-21 | End: 2019-12-21

## 2019-12-21 RX ORDER — CEPHALEXIN 500 MG/1
500 CAPSULE ORAL 3 TIMES DAILY
Qty: 15 CAP | Refills: 0 | Status: SHIPPED | OUTPATIENT
Start: 2019-12-21 | End: 2019-12-26

## 2019-12-21 RX ORDER — CLOTRIMAZOLE AND BETAMETHASONE DIPROPIONATE 10; .64 MG/G; MG/G
CREAM TOPICAL
Qty: 15 G | Refills: 0 | Status: SHIPPED | OUTPATIENT
Start: 2019-12-21

## 2019-12-21 RX ORDER — FUROSEMIDE 20 MG/1
20 TABLET ORAL DAILY
COMMUNITY

## 2019-12-21 RX ORDER — CLOTRIMAZOLE AND BETAMETHASONE DIPROPIONATE 10; .64 MG/G; MG/G
CREAM TOPICAL
Qty: 15 G | Refills: 0 | Status: SHIPPED | OUTPATIENT
Start: 2019-12-21 | End: 2019-12-21

## 2019-12-21 NOTE — ED TRIAGE NOTES
Pt co rash vs abscess at the coccyx area states has been there x one week and worsened over last few days

## 2019-12-21 NOTE — ED PROVIDER NOTES
EMERGENCY DEPARTMENT HISTORY AND PHYSICAL EXAM    11:52 AM      Date: 12/21/2019  Patient Name: Esperanza Ybarra    History of Presenting Illness     Chief Complaint   Patient presents with    Rash    Abscess         History Provided By: Patient and Patient's     Additional History (Context): Esperanza Ybarra is a 59 y.o. female with Past medical history of hypertension, COPD who presents with chief complaint of rash on her upper buttock that started a few days ago. Patient reports that it started off as a red area and she began putting Neosporin on it. Few days later a raised area appeared, and now it is spreading. Today itchiness started. She denies any insect bite, fever, drainage, bowel dysfunction, and no other complaint. PCP: Minh Jaime MD        Past History     Past Medical History:  Past Medical History:   Diagnosis Date    Chronic obstructive pulmonary disease ([address]     Colon polyps     Hypertension     Left adrenal mass [facility])        Past Surgical History:  Past Surgical History:   Procedure Laterality Date    HX HYSTERECTOMY  2004    HX PARTIAL HYSTERECTOMY  1990s       Family History:  No family history on file. Social History:  Social History     Tobacco Use    Smoking status: Former Smoker    Smokeless tobacco: Never Used   Substance Use Topics    Alcohol use: Yes     Alcohol/week: 1.7 standard drinks     Types: 2 Cans of beer per week    Drug use: Not on file       Allergies:  No Known Allergies      Review of Systems       Review of Systems   Constitutional: Negative for chills and fever. HENT: Negative for sore throat and trouble swallowing. Eyes: Negative for visual disturbance. Respiratory: Negative. Cardiovascular: Negative. Gastrointestinal: Negative for abdominal pain, diarrhea, nausea and vomiting. Genitourinary: Negative for dysuria. Musculoskeletal: Negative for arthralgias and neck stiffness. Skin: Positive for rash.  Negative for pallor. Rash on upper mid buttocks that is spreading and itchy   Neurological: Negative for weakness. Hematological: Does not bruise/bleed easily. Psychiatric/Behavioral: Negative for confusion and dysphoric mood. All other systems reviewed and are negative. Physical Exam   There were no vitals taken for this visit. Physical Exam  Vitals signs and nursing note reviewed. Constitutional:       General: She is not in acute distress. Appearance: She is well-developed. She is not diaphoretic. HENT:      Head: Normocephalic and atraumatic. Eyes:      General: No scleral icterus. Conjunctiva/sclera: Conjunctivae normal.      Pupils: Pupils are equal, round, and reactive to light. Neck:      Musculoskeletal: Normal range of motion and neck supple. Cardiovascular:      Rate and Rhythm: Normal rate. Comments: Capillary refill < 3 seconds  Pulmonary:      Effort: Pulmonary effort is normal. No respiratory distress. Breath sounds: Normal breath sounds. No wheezing. Abdominal:      General: Bowel sounds are normal. There is no distension. Palpations: Abdomen is soft. Tenderness: There is no tenderness. Musculoskeletal: Normal range of motion. Lymphadenopathy:      Cervical: No cervical adenopathy. Skin:     General: Skin is warm and dry. Findings: Rash present. Comments: 3 cm rash noted at the pilonidal region. There is no fluctuance and there is very minimal discomfort when I palpated. Around the circumference of this rash it is erythematous. At the more central area of this rash there is a macular papule area. No vesicles noted    Cellulitis versus fungal or both   Neurological:      General: No focal deficit present. Mental Status: She is alert and oriented to person, place, and time. Cranial Nerves: No cranial nerve deficit. Psychiatric:         Mood and Affect: Mood normal.         Thought Content:  Thought content normal. Diagnostic Study Results     Labs -  No results found for this or any previous visit (from the past 12 hour(s)). Radiologic Studies -   No orders to display         Medical Decision Making   I am the first provider for this patient. I reviewed the vital signs, available nursing notes, past medical history, past surgical history, family history and social history. Vital Signs-Reviewed the patient's vital signs. Records Reviewed: Nursing Notes and Old Medical Records (Time of Review: 11:52 AM)    Provider Notes (Medical Decision Making): DDX: Cellulitis, fungal infection, ringworm      MDM    Medications - No data to display        ED Course: Progress Notes, Reevaluation, and Consults:  I have reassessed the patient. I have discussed the workup, results and plan with the patient and patient is in agreement. Patient is feeling better. Patient will be prescribed Keflex, clotrimazolebetamethasone patient was discharge in stable condition. Patient was given outpatient follow up. Patient is to return to emergency department if any new or worsening condition. Diagnosis     Clinical Impression:   1. Cellulitis of buttock    2. Rash        Disposition: Discharged    Follow-up Information     Follow up With Specialties Details Why Contact Jasmyne Nj DO Family Practice Schedule an appointment as soon as possible for a visit in 3 days  [address] [facility]      [address] [facility] EMERGENCY DEPT Emergency Medicine  As needed, If symptoms worsen [address] [facility]  [phone]           Discharge Medication List as of 12/21/2019 12:49 PM      START taking these medications    Details   cephALEXin (KEFLEX) 500 mg capsule Take 1 Cap by mouth three (3) times daily for 5 days.  Indications: an infection of the skin and the tissue below the skin, Normal, Disp-15 Cap, R-0      clotrimazole-betamethasone (LOTRISONE) topical cream Apply to affected area for twice daily for 14 days, Normal, Disp-15 g, R-0         CONTINUE these medications which have NOT CHANGED    Details   furosemide (LASIX) 20 mg tablet Take 20 mg by mouth daily. , Historical Med      naproxen (NAPROSYN) 375 mg tablet TAKE 1 TABLET BY MOUTH TWICE A DAY WITH FOOD, Historical Med, R-3      dilTIAZem (TIAZAC) 360 mg ER capsule Historical Med      DUREZOL 0.05 % ophthalmic emulsion INSTILL 1 DROP IN LEFT EYE ONCE DAILY, Historical Med, R-3, FELIPE      multivitamins chew Take  by mouth., Historical Med      omeprazole (PRILOSEC) 40 mg capsule TAKE ONE CAPSULE BY MOUTH EVERY MORNING, Historical Med, R-3               Rosa Hou DO    Dragon medical dictation software was used for portions of this report. Unintended transcription errors may occur. My signature above authenticates this document and my orders, the final    diagnosis (es), discharge prescription (s), and instructions in the Epic    record.

## 2019-12-21 NOTE — ED NOTES
I have reviewed discharge instructions with the patient and spouse. The patient and spouse verbalized understanding. Medication teaching given, to include name, dose, action, and side effects. Patient verbalized understanding of medications. Encouraged patient to voice any concerns with reassurance provided. Patient Discharged in stable condition.

## 2019-12-21 NOTE — DISCHARGE INSTRUCTIONS
Patient Education      If you were prescribed any medication take as directed. Follow up with your primary care physician or with specialist as directed. Return to the emergency room with any new or worsening conditions. Cellulitis: Care Instructions  Your Care Instructions    Cellulitis is a skin infection caused by bacteria, most often strep or staph. It often occurs after a break in the skin from a scrape, cut, bite, or puncture, or after a rash. Cellulitis may be treated without doing tests to find out what caused it. But your doctor may do tests, if needed, to look for a specific bacteria, like methicillin-resistant Staphylococcus aureus (MRSA). The doctor has checked you carefully, but problems can develop later. If you notice any problems or new symptoms, get medical treatment right away. Follow-up care is a key part of your treatment and safety. Be sure to make and go to all appointments, and call your doctor if you are having problems. It's also a good idea to know your test results and keep a list of the medicines you take. How can you care for yourself at home? · Take your antibiotics as directed. Do not stop taking them just because you feel better. You need to take the full course of antibiotics. · Prop up the infected area on pillows to reduce pain and swelling. Try to keep the area above the level of your heart as often as you can. · If your doctor told you how to care for your wound, follow your doctor's instructions. If you did not get instructions, follow this general advice:  ? Wash the wound with clean water 2 times a day. Don't use hydrogen peroxide or alcohol, which can slow healing. ? You may cover the wound with a thin layer of petroleum jelly, such as Vaseline, and a nonstick bandage. ? Apply more petroleum jelly and replace the bandage as needed. · Be safe with medicines. Take pain medicines exactly as directed.   ? If the doctor gave you a prescription medicine for pain, take it as prescribed. ? If you are not taking a prescription pain medicine, ask your doctor if you can take an over-the-counter medicine. To prevent cellulitis in the future  · Try to prevent cuts, scrapes, or other injuries to your skin. Cellulitis most often occurs where there is a break in the skin. · If you get a scrape, cut, mild burn, or bite, wash the wound with clean water as soon as you can to help avoid infection. Don't use hydrogen peroxide or alcohol, which can slow healing. · If you have swelling in your legs (edema), support stockings and good skin care may help prevent leg sores and cellulitis. · Take care of your feet, especially if you have diabetes or other conditions that increase the risk of infection. Wear shoes and socks. Do not go barefoot. If you have athlete's foot or other skin problems on your feet, talk to your doctor about how to treat them. When should you call for help? Call your doctor now or seek immediate medical care if:    · You have signs that your infection is getting worse, such as:  ? Increased pain, swelling, warmth, or redness. ? Red streaks leading from the area. ? Pus draining from the area. ? A fever.     · You get a rash.    Watch closely for changes in your health, and be sure to contact your doctor if:    · You do not get better as expected. Where can you learn more? Go to http://domingo-garrett.info/. Coleman Goode in the search box to learn more about \"Cellulitis: Care Instructions. \"  Current as of: April 1, 2019  Content Version: 12.2  © 3418-9436 [employer]. Care instructions adapted under license by [employer] (which disclaims liability or warranty for this information). If you have questions about a medical condition or this instruction, always ask your healthcare professional. [address] any warranty or liability for your use of this information.          Patient Education        Rash: Care Instructions  Your Care Instructions  A rash is any irritation or inflammation of the skin. Rashes have many possible causes, including allergy, infection, illness, heat, and emotional stress. Follow-up care is a key part of your treatment and safety. Be sure to make and go to all appointments, and call your doctor if you are having problems. It's also a good idea to know your test results and keep a list of the medicines you take. How can you care for yourself at home? · Wash the area with water only. Soap can make dryness and itching worse. Pat dry. · Put cold, wet cloths on the rash to reduce itching. · Keep cool, and stay out of the sun. · Leave the rash open to the air as much of the time as possible. · Sometimes petroleum jelly (Vaseline) can help relieve the discomfort caused by a rash. A moisturizing lotion, such as Cetaphil, also may help. Calamine lotion may help for rashes caused by contact with something (such as a plant or soap) that irritated the skin. Use it 3 or 4 times a day. · If your doctor prescribed a cream, use it as directed. If your doctor prescribed medicine, take it exactly as directed. · If your rash itches so badly that it interferes with your normal activities, take an over-the-counter antihistamine, such as diphenhydramine (Benadryl) or loratadine (Claritin). Read and follow all instructions on the label. When should you call for help? Call your doctor now or seek immediate medical care if:    · You have signs of infection, such as:  ? Increased pain, swelling, warmth, or redness. ? Red streaks leading from the area. ? Pus draining from the area. ? A fever.     · You have joint pain along with the rash.    Watch closely for changes in your health, and be sure to contact your doctor if:    · Your rash is changing or getting worse.  For example, call if you have pain along with the rash, the rash is spreading, or you have new blisters.     · You do not get better after 1 week.   Where can you learn more? Go to http://domingo-garrett.info/. Enter M198 in the search box to learn more about \"Rash: Care Instructions. \"  Current as of: April 1, 2019  Content Version: 12.2  © 8422-2585 [employer]. Care instructions adapted under license by [employer] (which disclaims liability or warranty for this information). If you have questions about a medical condition or this instruction, always ask your healthcare professional. [address] any warranty or liability for your use of this information.

## 2020-06-24 NOTE — PROCEDURE NOTE: CLINICAL
PROCEDURE NOTE: Punctal Plugs, Aureliano Charles ([id number], [id number]) #2 Bilateral Lower Lids. Diagnosis: Dry Eye Syndrome. Prior to treatment, the risks/benefits/alternatives were discussed. The patient wished to proceed with procedure. Temporary collagen plugs were inserted. Patient tolerated procedure well. There were no complications. Post procedure instructions given. Wayne Daniel

## 2021-02-08 NOTE — PROCEDURE NOTE: CLINICAL
PROCEDURE NOTE: Punctal Plugs, Debra Bal ([id number], [id number]) #2 Bilateral Lower Lids. Diagnosis: Dry Eye Syndrome. Prior to treatment, the risks/benefits/alternatives were discussed. The patient wished to proceed with procedure. Temporary collagen plugs were inserted. Patient tolerated procedure well. There were no complications. Post procedure instructions given. Derek Hughes

## 2021-03-15 ENCOUNTER — HOSPITAL ENCOUNTER (OUTPATIENT)
Dept: PHYSICAL THERAPY | Age: 66
Discharge: HOME | End: 2021-03-15
Payer: COMMERCIAL

## 2021-03-15 PROCEDURE — 97161 PT EVAL LOW COMPLEX 20 MIN: CPT

## 2021-03-15 PROCEDURE — 97110 THERAPEUTIC EXERCISES: CPT

## 2021-03-15 PROCEDURE — 97530 THERAPEUTIC ACTIVITIES: CPT

## 2021-03-15 NOTE — PROGRESS NOTES
PT DAILY TREATMENT NOTE 10-18    Patient Name: Hailey Jones  Date:3/15/2021  : 1955  [x]  Patient  Verified  Payor: Jg Flores / Plan: Mag Nguyen / Product Type: HMO /    In time:3:46  Out time:4:30  Total Treatment Time (min): 44  Visit #: 1 of 10    Medicare/BCBS Only   Total Timed Codes (min):  25 1:1 Treatment Time:  44       Treatment Area: Pain in right ankle and joints of right foot [M25.571]  Pain in left ankle and joints of left foot [M25.572]  Polyarthritis, unspecified [M13.0]    SUBJECTIVE  Pain Level (0-10 scale): 9  Any medication changes, allergies to medications, adverse drug reactions, diagnosis change, or new procedure performed?: [x] No    [] Yes (see summary sheet for update)  Subjective functional status/changes:   [] No changes reported  See POC    OBJECTIVE    19 min [x]Eval                  []Re-Eval       8 min Therapeutic Exercise:  [] See flow sheet :   Rationale: increase ROM and increase strength to improve the patients ability to ambulate prolonged distances. 17 min Therapeutic Activity:  []  See flow sheet : Patient education on therapy assessment, prognosis, expectations for therapy sessions, patient goals, Achilles tendinosis causes, foot/ankle mechanics with gait, use of heel lift for symptom relief, and HEP. Rationale: to improve the patients ability to adhere to HEP and therapy sessions for increased compliance when working toward therapy goals. With   [] TE   [x] TA   [] neuro   [] other: Patient Education: [x] Review HEP    [] Progressed/Changed HEP based on:   [] positioning   [] body mechanics   [] transfers   [] heat/ice application    [] other:      Other Objective/Functional Measures: See POC     Pain Level (0-10 scale) post treatment: 8    ASSESSMENT/Changes in Function: See POC. Pt reports improvement with heel lift in clinic.     Patient will continue to benefit from skilled PT services to modify and progress therapeutic interventions, address functional mobility deficits, address ROM deficits, address strength deficits, analyze and address soft tissue restrictions, analyze and cue movement patterns, analyze and modify body mechanics/ergonomics, assess and modify postural abnormalities, address imbalance/dizziness and instruct in home and community integration to attain remaining goals.      [x]  See Plan of Care  []  See progress note/recertification  []  See Discharge Summary         Progress towards goals / Updated goals:  See POC    PLAN  [x]  Upgrade activities as tolerated     []  Continue plan of care  [x]  Update interventions per flow sheet       []  Discharge due to:_  []  Other:_      Cruzito Rocha 3/15/2021  3:26 PM    Future Appointments   Date Time Provider Corina Hall   3/15/2021  3:45 PM Pradeeplemichael [facility]

## 2021-03-15 NOTE — PROGRESS NOTES
[facility], [address] [place] [address]  [phone] fax  Plan of Care/ Statement of Necessity for Physical Therapy Services     Patient name: Erica Limon Start of Care: 3/15/2021   Referral source: Hari Zaman MD : 1955    Medical Diagnosis: Pain in right ankle and joints of right foot [M25.571]  Pain in left ankle and joints of left foot [M25.572]  Polyarthritis, unspecified [M13.0]  Payor: Conor Tejada / Plan: Chanell Mckeon / Product Type: HMO /  Onset Date:3/4/21    Treatment Diagnosis: B foot/ankle pain   Prior Hospitalization: see medical history Provider#: [id number]   Medications: Verified on Patient summary List    Comorbidities: hx of left foot fracture 4-5 years ago, HTN   Prior Level of Function: functionally independent, walking community distances without AD, works from home as     The [address] [place] [address] and following information is based on the information from the initial evaluation. Assessment/ key information: Pt is a pleasant 72 y.o. female who presents with c/o B foot/ankle pain. The patient reports an insidious onset of B ankle pain, left > right, approximately one year ago; this pain has worsened over time and now limits prolonged ambulation and standing ADLs. Signs/symptoms at eval consistent with likely B Achilles tendinosis and B plantar fasciosis. Functional deficits include: decreased great toe extension PROM, decreased ambulation tolerance, and impaired SLS balance. Rehab potential is fair due to chronicity of conditon. Pt would benefit from skilled PT to address above deficits to improve Pt's function and ability to return to PLOF ambulation abilities with decreased pain.     Evaluation Complexity History HIGH Complexity :3+ comorbidities / personal factors will impact the outcome/ POC ; Examination MEDIUM Complexity : 3 Standardized tests and measures addressing body structure, function, activity limitation and / or participation in recreation  ;Presentation LOW Complexity : Stable, uncomplicated  ;Clinical Decision Making MEDIUM Complexity : FOTO score of 26-74  Overall Complexity Rating: LOW   Problem List: pain affecting function, decrease ROM, decrease strength, edema affecting function, impaired gait/ balance, decrease ADL/ functional abilitiies, decrease activity tolerance, decrease flexibility/ joint mobility and decrease transfer abilities   Treatment Plan may include any combination of the following: Therapeutic exercise, Therapeutic activities, Neuromuscular re-education, Physical agent/modality, Gait/balance training, Manual therapy, Aquatic therapy, Patient education, Self Care training, Functional mobility training, Home safety training and Stair training  Patient / Family readiness to learn indicated by: asking questions  Persons(s) to be included in education: patient (P)  Barriers to Learning/Limitations: None  Patient Goal (s): being able to walk with less pain  Patient Self Reported Health Status: good  Rehabilitation Potential: good    Short Term Goals: To be accomplished in 1 week  - Goal: Pt to be compliant with initial HEP to improve calf strength/mobility. Status at last note/certification: Established and reviewed with Pt  Long Term Goals: To be accomplished in 10 treatments  - Goal: Pt to demo B great toe extension PROM of at least 70 deg to improve ease of terminal stance during ambulation. Status at last note/certification: great toe extension right 63 deg, left 59 deg  - Goal: Pt to report < 4/10 pain at worst following ambulation of 30 minutes or greater to improve ease with grocery shopping activities. Status at last note/certification: 0/64 pain at worst, ambulation distance limited  - Goal: Pt to demo B SLS of at least 10 seconds without LOB to improve safety with community ambulation.   Status at last note/certification: SLS right 6 seconds, left 7 seconds  - Goal: Pt to report FOTO score of at least 61 pts to show improved function and quality of life. Status at last note/certification: FOTO 44 pts       Frequency / Duration: Patient to be seen 1-2 times per week for 10 treatments. Patient/ Caregiver education and instruction: Diagnosis, prognosis, self care, activity modification, brace/ splint application and exercises   [x]  Plan of care has been reviewed with ALYSIA Alcocer 3/15/2021 3:26 PM  _____________________________________________________________________  I certify that the above Therapy Services are being furnished while the patient is under my care. I agree with the treatment plan and certify that this therapy is necessary.     [de-identified] Signature:____________Date:_________TIME:________     Stacey Thibodeaux MD  ** Signature, Date and Time must be completed for valid certification **    Please sign and return to [facility] [address]  [phone] fax

## 2021-03-23 ENCOUNTER — HOSPITAL ENCOUNTER (OUTPATIENT)
Dept: PHYSICAL THERAPY | Age: 66
Discharge: HOME | End: 2021-03-23
Payer: COMMERCIAL

## 2021-03-23 PROCEDURE — 97140 MANUAL THERAPY 1/> REGIONS: CPT

## 2021-03-23 PROCEDURE — 97110 THERAPEUTIC EXERCISES: CPT

## 2021-03-23 NOTE — PROGRESS NOTES
PT DAILY TREATMENT NOTE 10-18    Patient Name: Gabrielle Vaca  Date:3/23/2021  : 1955  [x]  Patient  Verified  Payor: Jennifer Kim / Plan: Arely Strickland / Product Type: HMO /    In time:4:32  Out time:5:12  Total Treatment Time (min): 40  Visit #: 2 of 10    Medicare/BCBS Only   Total Timed Codes (min):  40 1:1 Treatment Time:  38       Treatment Area: Pain in right ankle and joints of right foot [M25.571]  Pain in left ankle and joints of left foot [M25.572]  Polyarthritis, unspecified [M13.0]    SUBJECTIVE  Pain Level (0-10 scale): 7  Any medication changes, allergies to medications, adverse drug reactions, diagnosis change, or new procedure performed?: [x] No    [] Yes (see summary sheet for update)  Subjective functional status/changes:   [] No changes reported  \"I really have not been doing my exercises to be honest.\"    OBJECTIVE    15 min Therapeutic Exercise:  [x] See flow sheet :   Rationale: increase ROM and increase strength to improve the patients ability to perform gait and transfers with improved LE strength and mobility. 2 min Therapeutic Activity:  [x]  See flow sheet :   Patient education: blood pressure reading     23 min Manual Therapy:  Effleurage/petrissage of B calves with massage cream, initiating at gastroc heads and progressing focus to soleus; cross friction massage at B Achilles tendons   The manual therapy interventions were performed at a separate and distinct time from the therapeutic activities interventions. Rationale: decrease pain, increase ROM, increase tissue extensibility and decrease trigger points to improve ease of ADLs after therapy session.           With   [x] TE   [] TA   [] neuro   [] other: Patient Education: [x] Review HEP    [] Progressed/Changed HEP based on:   [] positioning   [] body mechanics   [] transfers   [] heat/ice application    [] other:      Other Objective/Functional Measures: -Initiated treatment per flowsheet     Pain Level (0-10 scale) post treatment: 5    ASSESSMENT/Changes in Function: Patient requiring visual/verbal cuing for correct technique with today's interventions. Able to achieve significant symptom reduction with manual therapy today. Patient will continue to benefit from skilled PT services to modify and progress therapeutic interventions, address functional mobility deficits, address ROM deficits, address strength deficits, analyze and address soft tissue restrictions, analyze and cue movement patterns, analyze and modify body mechanics/ergonomics, assess and modify postural abnormalities and address imbalance/dizziness to attain remaining goals. []  See Plan of Care  []  See progress note/recertification  []  See Discharge Summary         Progress towards goals / Updated goals:  Short Term Goals: To be accomplished in 1 week  - Goal: Pt to be compliant with initial HEP to improve calf strength/mobility. Status at last note/certification: Established and reviewed with Pt   Current: not met, pt reports lack of compliance since evaluation  Long Term Goals: To be accomplished in 10 treatments  - Goal: Pt to demo B great toe extension PROM of at least 70 deg to improve ease of terminal stance during ambulation. Status at last note/certification: great toe extension right 63 deg, left 59 deg  - Goal: Pt to report < 4/10 pain at worst following ambulation of 30 minutes or greater to improve ease with grocery shopping activities. Status at last note/certification: 8/56 pain at worst, ambulation distance limited  - Goal: Pt to demo B SLS of at least 10 seconds without LOB to improve safety with community ambulation. Status at last note/certification: SLS right 6 seconds, left 7 seconds  - Goal: Pt to report FOTO score of at least 61 pts to show improved function and quality of life.   Status at last note/certification: FOTO 44 pts     PLAN  [x]  Upgrade activities as tolerated     [x]  Continue plan of care  []  Update interventions per flow sheet       []  Discharge due to:_  []  Other:_      Viv Mcnair 3/23/2021  12:44 PM    Future Appointments   Date Time Provider Corina Hall   3/23/2021  4:30 PM Justina Tabor   3/24/2021  4:30 PM Hunter Segura, [facility]   3/29/2021  3:45 PM Elnita Rumble [facility]   3/31/2021  3:45 PM Elnita Rumble [facility]   4/5/2021  3:45 PM Elnita Rumble [facility]   4/8/2021  3:45 PM Loc [facility]   4/12/2021  3:45 PM Elnita Rumble [facility]   4/14/2021  3:45 PM Elnita Rumble [facility]   4/20/2021  3:45 PM Justina Tabor

## 2021-03-24 ENCOUNTER — APPOINTMENT (OUTPATIENT)
Dept: PHYSICAL THERAPY | Age: 66
End: 2021-03-24
Payer: COMMERCIAL

## 2021-03-29 ENCOUNTER — HOSPITAL ENCOUNTER (OUTPATIENT)
Dept: PHYSICAL THERAPY | Age: 66
Discharge: HOME | End: 2021-03-29
Payer: COMMERCIAL

## 2021-03-29 PROCEDURE — 97112 NEUROMUSCULAR REEDUCATION: CPT

## 2021-03-29 PROCEDURE — 97110 THERAPEUTIC EXERCISES: CPT

## 2021-03-29 PROCEDURE — 97140 MANUAL THERAPY 1/> REGIONS: CPT

## 2021-03-29 NOTE — PROGRESS NOTES
PT DAILY TREATMENT NOTE     Patient Name: Delroy Falcon  Date:3/29/2021  : 1955  [x]  Patient  Verified  Payor: Gloria Montanez / Plan: Tamar Goff / Product Type: HMO /    In time:3:45  Out time:4:35  Total Treatment Time (min): 50  Visit #: 3 of 10    Medicare/BCBS Only   Total Timed Codes (min):  50 1:1 Treatment Time:  50       Treatment Area: Pain in right ankle and joints of right foot [M25.571]  Pain in left ankle and joints of left foot [M25.572]  Polyarthritis, unspecified [M13.0]    SUBJECTIVE  Pain Level (0-10 scale): 8  Any medication changes, allergies to medications, adverse drug reactions, diagnosis change, or new procedure performed?: [x] No    [] Yes (see summary sheet for update)  Subjective functional status/changes:   [] No changes reported  I felt better for a few hours after last session, but after walking it returned    OBJECTIVE        32 min Therapeutic Exercise:  [] See flow sheet :   Rationale: increase ROM and increase strength to improve the patients ability to improve activity tolerance    10 min Neuromuscular Re-education:  []  See flow sheet :   Rationale: increase strength, improve coordination and increase proprioception  to improve the patients ability to increase stability for gait    8 min Manual Therapy:  STM to bilateral gastroc, PF with midfoot and forefoot mobs for mobiity   The manual therapy interventions were performed at a separate and distinct time from the therapeutic activities interventions.   Rationale: decrease pain, increase ROM and increase tissue extensibility to reduce strain on achilles for gait       With   [] TE   [] TA   [] neuro   [] other: Patient Education: [x] Review HEP    [] Progressed/Changed HEP based on:   [] positioning   [] body mechanics   [] transfers   [] heat/ice application    [] other:      Other Objective/Functional Measures: progressed program to include  Ankle x 4 with GTB, MSR and 1/2 stance     Pain Level (0-10 scale) post treatment: 6    ASSESSMENT/Changes in Function: medialvhead of gatsroc with increaed tenderness and pain , bilaterally    Patient will continue to benefit from skilled PT services to modify and progress therapeutic interventions, address functional mobility deficits, address ROM deficits, address strength deficits, analyze and address soft tissue restrictions, analyze and cue movement patterns, analyze and modify body mechanics/ergonomics, assess and modify postural abnormalities, address imbalance/dizziness and instruct in home and community integration to attain remaining goals. []  See Plan of Care  []  See progress note/recertification  []  See Discharge Summary         Progress towards goals / Updated goals:  - Goal: Pt to be compliant with initial HEP to improve calf strength/mobility. Status at last note/certification: Established and reviewed with Pt   Current: not met, pt reports lack of compliance since evaluation  Long Term Goals: To be accomplished in 10 treatments  - Goal: Pt to demo B great toe extension PROM of at least 70 deg to improve ease of terminal stance during ambulation. Status at last note/certification: great toe extension right 63 deg, left 59 deg  - Goal: Pt to report < 4/10 pain at worst following ambulation of 30 minutes or greater to improve ease with grocery shopping activities. Status at last note/certification: 9/10 pain at worst, ambulation distance limited  - Goal: Pt to demo B SLS of at least 10 seconds without LOB to improve safety with community ambulation. Status at last note/certification: SLS right 6 seconds, left 7 seconds  - Goal: Pt to report FOTO score of at least 61 pts to show improved function and quality of life.   Status at last note/certification: TFHD 19 VZG     PLAN  []  Upgrade activities as tolerated     []  Continue plan of care  []  Update interventions per flow sheet       []  Discharge due to:_  []  Other:_      [employer], PTA 3/29/2021  3:57 PM    Future Appointments   Date Time Provider Department Center   3/31/2021  3:45 PM Freddie [facility]   4/5/2021  3:45 PM Dipesh Erickson [facility]   [facility]  [facility]   4/12/2021  3:45 PM Vannesa [facility]   [facility]/14[facility]2021  [facility]   4/20/2021  3:45 PM Yani Banuelos

## 2021-03-31 ENCOUNTER — HOSPITAL ENCOUNTER (OUTPATIENT)
Dept: PHYSICAL THERAPY | Age: 66
Discharge: HOME | End: 2021-03-31
Payer: COMMERCIAL

## 2021-03-31 PROCEDURE — 97140 MANUAL THERAPY 1/> REGIONS: CPT

## 2021-03-31 PROCEDURE — 97112 NEUROMUSCULAR REEDUCATION: CPT

## 2021-03-31 PROCEDURE — 97110 THERAPEUTIC EXERCISES: CPT

## 2021-03-31 NOTE — PROGRESS NOTES
PT DAILY TREATMENT NOTE     Patient Name: Quique Pineda  Date:3/31/2021  : 1955  [x]  Patient  Verified  Payor: Lilly Loomis / Plan: Ellyn Andres / Product Type: HMO /    In time:3:50  Out time: 4:35  Total Treatment Time (min): 45  Visit #: 4 of 10    Medicare/BCBS Only   Total Timed Codes (min):  45 1:1 Treatment Time:  45       Treatment Area: Pain in right ankle and joints of right foot [M25.571]  Pain in left ankle and joints of left foot [M25.572]  Polyarthritis, unspecified [M13.0]    SUBJECTIVE  Pain Level (0-10 scale): 8  Any medication changes, allergies to medications, adverse drug reactions, diagnosis change, or new procedure performed?: [x] No    [] Yes (see summary sheet for update)  Subjective functional status/changes:   [] No changes reported  I've been on my feet a lot today so they are hurting    OBJECTIVE      23 min Therapeutic Exercise:  [] See flow sheet :   Rationale: increase ROM and increase strength to improve the patients ability to increase standing/walking tolerance       12 min Neuromuscular Re-education:  []  See flow sheet :   Rationale: increase strength, improve coordination and improve balance  to improve the patients ability to increase ankle stability for ambulation on even/uneven surfaces    10 min Manual Therapy:  STM to bilateal gastroc and PF. TC and midfoot/forefoot mobs   The manual therapy interventions were performed at a separate and distinct time from the therapeutic activities interventions.   Rationale: decrease pain, increase ROM and increase tissue extensibility to improve ease of shopping       With   [] TE   [] TA   [] neuro   [] other: Patient Education: [x] Review HEP    [] Progressed/Changed HEP based on:   [] positioning   [] body mechanics   [] transfers   [] heat/ice application    [] other:      Other Objective/Functional Measures: ex's per card     Pain Level (0-10 scale) post treatment: 6    ASSESSMENT/Changes in Function: pain reduction after session. Continues to report increased pain with walking more than 10-15  Minutes, but improved since [facility]    Patient will continue to benefit from skilled PT services to modify and progress therapeutic interventions, address functional mobility deficits, address ROM deficits, address strength deficits, analyze and address soft tissue restrictions, analyze and cue movement patterns, analyze and modify body mechanics/ergonomics, assess and modify postural abnormalities, address imbalance/dizziness and instruct in home and community integration to attain remaining goals. []  See Plan of Care  []  See progress note/recertification  []  See Discharge Summary          Progress towards goals / Updated goals:  - Goal: Pt to be compliant with initial HEP to improve calf strength/mobility. Status at last note/certification: Established and reviewed with Pt   Current: not met, pt reports lack of compliance since evaluation  Long Term Goals: To be accomplished in 10 treatments  - Goal: Pt to demo B great toe extension PROM of at least 70 deg to improve ease of terminal stance during ambulation. Status at last note/certification: great toe extension right 63 deg, left 59 deg  - Goal: Pt to report < 4/10 pain at worst following ambulation of 30 minutes or greater to improve ease with grocery shopping activities. Status at last note/certification: 9/10 pain at worst, ambulation distance limited   10-15 minutes  SOC about 5  Minutes still goes to 9/10 if doing too much  - Goal: Pt to demo B SLS of at least 10 seconds without LOB to improve safety with community ambulation. Status at last note/certification: SLS right 6 seconds, left 7 seconds  Right 15, left 30 seconds progressing  - Goal: Pt to report FOTO score of at least 61 pts to show improved function and quality of life.   Status at last note/certification: MGDW 21 CPJ        PLAN  [x]  Upgrade activities as tolerated     []  Continue plan of care  [] Update interventions per flow sheet       []  Discharge due to:_  []  Other:_      Carrie Abdiel, PTA 3/31/2021  3:54 PM    Future Appointments   Date Time Provider Corina Hall   4/5/2021  3:45 PM Gaudencio Anderson [facility]   4/8/2021  3:45 PM Aron [facility]   4/12/2021  3:45 PM Aron [facility]   4/14/2021  3:45 PM Aron [facility]   4/20/2021  3:45 PM Buck, [address]

## 2021-04-05 ENCOUNTER — APPOINTMENT (OUTPATIENT)
Dept: PHYSICAL THERAPY | Age: 66
End: 2021-04-05

## 2021-04-05 NOTE — PROGRESS NOTES
[facility] MODESTO CASTELLON [facility], [address] [place] [address]  [phone] fax    Discharge Summary  Patient name: Harman Souza Start of Care: 3/15/2021   Referral source: Felipa Zimmerman MD : [id number]   Medical/Treatment Diagnosis: Pain in right ankle and joints of right foot [M25.571]  Pain in left ankle and joints of left foot [M25.572]  Polyarthritis, unspecified [M13.0]  Payor: [employer] / Plan: Lesli Dears / Product Type: HMO /  Onset Date:3/4/2021     Prior Hospitalization: see medical history Provider#: [id number]   Medications: Verified on Patient Summary List    Comorbidities: hx of left foot fracture 4-5 years ago, HTN  Prior Level of Function:functionally independent, walking community distances without AD, works from home as     Visits from Start of Care: 4    Missed Visits: 1    Reporting Period : 3/15/2021 to 3/31/ 2021    - Goal: Pt to be compliant with initial HEP to improve calf strength/mobility. Status at last note/certification: Established and reviewed with Pt   Current: not met, pt reports lack of compliance since evaluation  Long Term Goals: To be accomplished in 10 treatments  - Goal: Pt to demo B great toe extension PROM of at least 70 deg to improve ease of terminal stance during ambulation. Status at last note/certification: great toe extension right 63 deg, left 59 deg  Current: Unable to assess  - Goal: Pt to report < 4/10 pain at worst following ambulation of 30 minutes or greater to improve ease with grocery shopping activities. Status at last note/certification: 9/10 pain at worst, ambulation distance limited   Current: progressing, 10-15 minutes   - Goal: Pt to demo B SLS of at least 10 seconds without LOB to improve safety with community ambulation.   Status at last note/certification: SLS right 6 seconds, left 7 seconds  Current: progressing, Right 15, left 30 seconds  - Goal: Pt to report FOTO score of at least 61 pts to show improved function and quality of life. Status at last note/certification: AZVD 05 VZN   Current:  Unable to assess      Assessment/ Summary of Care: Patient attended 4 sessions of PT including the evaluation. She was demonstrating improved ambulation tolerance and improved single leg stance B before requesting discharge for unknown reasons. She will thus be discharged without further assessment.     RECOMMENDATIONS:  [x]Discontinue therapy: []Patient has reached or is progressing toward set goals      [x]Patient is non-compliant or has abdicated      []Due to lack of appreciable progress towards set goals    Anay Acosta 4/5/2021 3:13 PM

## 2021-04-08 ENCOUNTER — APPOINTMENT (OUTPATIENT)
Dept: PHYSICAL THERAPY | Age: 66
End: 2021-04-08

## 2021-04-12 ENCOUNTER — APPOINTMENT (OUTPATIENT)
Dept: PHYSICAL THERAPY | Age: 66
End: 2021-04-12

## 2021-04-14 ENCOUNTER — APPOINTMENT (OUTPATIENT)
Dept: PHYSICAL THERAPY | Age: 66
End: 2021-04-14

## 2021-04-20 ENCOUNTER — APPOINTMENT (OUTPATIENT)
Dept: PHYSICAL THERAPY | Age: 66
End: 2021-04-20

## 2021-05-12 ENCOUNTER — TRANSCRIBE ORDER (OUTPATIENT)
Dept: SCHEDULING | Age: 66
End: 2021-05-12

## 2021-05-12 DIAGNOSIS — E04.1 THYROID NODULE: Primary | ICD-10-CM

## 2021-06-09 ENCOUNTER — HOSPITAL ENCOUNTER (OUTPATIENT)
Dept: ULTRASOUND IMAGING | Age: 66
Discharge: HOME | End: 2021-06-09
Attending: FAMILY MEDICINE
Payer: COMMERCIAL

## 2021-06-09 DIAGNOSIS — E04.1 THYROID NODULE: ICD-10-CM

## 2021-06-09 PROCEDURE — 76536 US EXAM OF HEAD AND NECK: CPT

## 2021-08-09 NOTE — PROCEDURE NOTE: CLINICAL
PROCEDURE NOTE: Punctal Plugs, aJzmine Gomez ([id number], [id number]) #2 Bilateral Lower Lids. Diagnosis: Dry Eye Syndrome. Prior to treatment, the risks/benefits/alternatives were discussed. The patient wished to proceed with procedure. Temporary collagen plugs were inserted. Patient tolerated procedure well. There were no complications. Post procedure instructions given. Anita 0.4mm BLL.

## 2021-11-05 ENCOUNTER — TRANSCRIBE ORDER (OUTPATIENT)
Dept: SCHEDULING | Age: 66
End: 2021-11-05

## 2021-11-05 DIAGNOSIS — R22.1 NECK SWELLING: Primary | ICD-10-CM

## 2022-02-09 NOTE — PROCEDURE NOTE: CLINICAL
PROCEDURE NOTE: Punctal Plugs, Jazzmine Mccoy ([id number], [id number]) #2 Bilateral Lower Lids. Diagnosis: Dry Eye Syndrome. Prior to treatment, the risks/benefits/alternatives were discussed. The patient wished to proceed with procedure. Temporary collagen plugs were inserted. Patient tolerated procedure well. There were no complications. Post procedure instructions given. 0.4 MM QUINTESS BLL.

## 2022-04-02 ASSESSMENT — TONOMETRY
OD_IOP_MMHG: 16
OD_IOP_MMHG: 14
OD_IOP_MMHG: 16
OS_IOP_MMHG: 16
OD_IOP_MMHG: 15
OS_IOP_MMHG: 14
OS_IOP_MMHG: 12
OS_IOP_MMHG: 14

## 2022-04-02 ASSESSMENT — VISUAL ACUITY
OS_SC: 20/25
OD_SC: 20/20
OS_SC: 20/20
OD_SC: 20/20
OS_SC: 20/20
OS_SC: 20/20-1
OD_SC: 20/20
OD_SC: 20/20-1

## 2022-08-17 NOTE — PROCEDURE NOTE: CLINICAL
PROCEDURE NOTE: Punctal Plugs, Radha Pike ([id number], [id number]) #2 Bilateral Lower Lids. Diagnosis: Dry Eye Syndrome. Prior to treatment, the risks/benefits/alternatives were discussed. The patient wished to proceed with procedure. Temporary collagen plugs were inserted. Patient tolerated procedure well. There were no complications. Post procedure instructions given. Anita 0.4mm BLL.

## 2024-03-13 NOTE — PATIENT DISCUSSION
No Retinal holes, Tears or Detachments. 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 S/p craniectomy and evacuation course c/b seizures, last seen on 3/7/24 EEG currently on AEDS. Now s/p trach/peg 3/9/24. During EGD/PEG found to have superficial gastric ulcers. H. Pylori Stool Ag positive, for which GI is consulted.     1. H. Pylori   recc Bismuth quadruple therapy x 14 days (Omeprazole 20 mg BID, Tetracycline 500 mg QID, Metronidazole 250 mg QID and bismuth subsalicylate 2 tabs QID). Bismuth may turn her stool black. After completing treatment would cont once daily PPI for ppx. Pending clinical course, consider repeat stool Ag n 8 weeks to confirm clearance.    2. Resp failure s/p trach/PEG    3. IPH s/p craniotomy       I had a prolonged conversation with the patient regarding the hospital course, differential diagnosis, results of diagnostic tests this far, and therapeutic modalities available. Plan of care discussed with the patient after the evaluation. Patient expresses a clear understanding of the plan of care. Fifty minutes spent on the total encounter, of which more than fifty percent of the encounter was spent on counseling and/or coordinating care by the attending physician. Advanced care planning forms were discussed. Code status including forceful chest compressions, defibrillation and intubation were discussed. The risks benefits and alternatives to pertinent gastrointestinal procedures and interventions were discussed in detail and all questions were answered. Duration: 15 Minutes.      Jose Antonio Sepulveda D.O.  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 S/p craniectomy and evacuation course c/b seizures, last seen on 3/7/24 EEG currently on AEDS. Now s/p trach/peg 3/9/24. During EGD/PEG found to have superficial gastric ulcers. H. Pylori Stool Ag positive, for which GI is consulted.     1. H. Pylori   recc Bismuth quadruple therapy x 14 days to be started post discharge (Omeprazole 20 mg BID, Tetracycline 500 mg QID, Metronidazole 250 mg QID and bismuth subsalicylate 2 tabs QID). Bismuth may turn her stool black. After completing treatment would cont once daily PPI for ppx. Pending clinical course, consider repeat stool Ag n 8 weeks to confirm clearance.    2. Resp failure s/p trach/PEG    3. IPH s/p craniotomy       I had a prolonged conversation with the patient regarding the hospital course, differential diagnosis, results of diagnostic tests this far, and therapeutic modalities available. Plan of care discussed with the patient after the evaluation. Patient expresses a clear understanding of the plan of care. Fifty minutes spent on the total encounter, of which more than fifty percent of the encounter was spent on counseling and/or coordinating care by the attending physician. Advanced care planning forms were discussed. Code status including forceful chest compressions, defibrillation and intubation were discussed. The risks benefits and alternatives to pertinent gastrointestinal procedures and interventions were discussed in detail and all questions were answered. Duration: 15 Minutes.      Jose Antonio Sepulveda D.O.  Gastroenterology and Hepatology  [address] [facility], [address]  [place], [place]  Office: [phone]